# Patient Record
Sex: MALE | NOT HISPANIC OR LATINO
[De-identification: names, ages, dates, MRNs, and addresses within clinical notes are randomized per-mention and may not be internally consistent; named-entity substitution may affect disease eponyms.]

---

## 2018-01-31 ENCOUNTER — APPOINTMENT (OUTPATIENT)
Dept: UROLOGY | Facility: CLINIC | Age: 83
End: 2018-01-31
Payer: MEDICARE

## 2018-01-31 VITALS — BODY MASS INDEX: 21.71 KG/M2 | WEIGHT: 118 LBS | HEIGHT: 62 IN

## 2018-01-31 VITALS
HEART RATE: 68 BPM | TEMPERATURE: 98.2 F | SYSTOLIC BLOOD PRESSURE: 150 MMHG | DIASTOLIC BLOOD PRESSURE: 68 MMHG | RESPIRATION RATE: 15 BRPM

## 2018-01-31 PROCEDURE — 99204 OFFICE O/P NEW MOD 45 MIN: CPT

## 2018-03-02 ENCOUNTER — APPOINTMENT (OUTPATIENT)
Dept: UROLOGY | Facility: CLINIC | Age: 83
End: 2018-03-02
Payer: MEDICARE

## 2018-03-02 VITALS
HEART RATE: 67 BPM | RESPIRATION RATE: 16 BRPM | SYSTOLIC BLOOD PRESSURE: 156 MMHG | TEMPERATURE: 98.1 F | DIASTOLIC BLOOD PRESSURE: 60 MMHG | OXYGEN SATURATION: 97 %

## 2018-03-02 DIAGNOSIS — R39.15 URGENCY OF URINATION: ICD-10-CM

## 2018-03-02 PROCEDURE — 99213 OFFICE O/P EST LOW 20 MIN: CPT

## 2018-03-09 ENCOUNTER — APPOINTMENT (OUTPATIENT)
Dept: UROLOGY | Facility: CLINIC | Age: 83
End: 2018-03-09
Payer: MEDICARE

## 2018-03-09 DIAGNOSIS — N40.1 BENIGN PROSTATIC HYPERPLASIA WITH LOWER URINARY TRACT SYMPMS: ICD-10-CM

## 2018-03-09 DIAGNOSIS — N13.8 BENIGN PROSTATIC HYPERPLASIA WITH LOWER URINARY TRACT SYMPMS: ICD-10-CM

## 2018-03-09 PROCEDURE — 52000 CYSTOURETHROSCOPY: CPT

## 2018-03-15 PROBLEM — N40.1 BENIGN LOCALIZED HYPERPLASIA OF PROSTATE WITH URINARY OBSTRUCTION: Status: ACTIVE | Noted: 2018-01-31

## 2018-03-28 ENCOUNTER — APPOINTMENT (OUTPATIENT)
Dept: UROLOGY | Facility: CLINIC | Age: 83
End: 2018-03-28

## 2018-05-02 ENCOUNTER — APPOINTMENT (OUTPATIENT)
Dept: UROLOGY | Facility: CLINIC | Age: 83
End: 2018-05-02

## 2019-06-27 ENCOUNTER — APPOINTMENT (OUTPATIENT)
Dept: RHEUMATOLOGY | Facility: CLINIC | Age: 84
End: 2019-06-27
Payer: MEDICARE

## 2019-06-27 VITALS
RESPIRATION RATE: 16 BRPM | HEIGHT: 62 IN | DIASTOLIC BLOOD PRESSURE: 54 MMHG | SYSTOLIC BLOOD PRESSURE: 115 MMHG | BODY MASS INDEX: 20.24 KG/M2 | TEMPERATURE: 98.5 F | HEART RATE: 66 BPM | WEIGHT: 110 LBS | OXYGEN SATURATION: 96 %

## 2019-06-27 PROCEDURE — 99204 OFFICE O/P NEW MOD 45 MIN: CPT

## 2019-06-27 RX ORDER — OMEPRAZOLE 40 MG/1
40 CAPSULE, DELAYED RELEASE ORAL
Refills: 0 | Status: ACTIVE | COMMUNITY

## 2019-06-27 RX ORDER — TAMSULOSIN HYDROCHLORIDE 0.4 MG/1
0.4 CAPSULE ORAL
Refills: 0 | Status: ACTIVE | COMMUNITY

## 2019-06-27 RX ORDER — VALSARTAN 160 MG/1
160 TABLET, COATED ORAL
Refills: 0 | Status: ACTIVE | COMMUNITY

## 2019-06-27 RX ORDER — ATORVASTATIN CALCIUM 80 MG/1
TABLET, FILM COATED ORAL
Refills: 0 | Status: ACTIVE | COMMUNITY

## 2019-06-28 ENCOUNTER — RX RENEWAL (OUTPATIENT)
Age: 84
End: 2019-06-28

## 2019-06-28 DIAGNOSIS — E55.9 VITAMIN D DEFICIENCY, UNSPECIFIED: ICD-10-CM

## 2019-06-28 LAB
25(OH)D3 SERPL-MCNC: 15 NG/ML
ALBUMIN SERPL ELPH-MCNC: 4.2 G/DL
ALP BLD-CCNC: 43 U/L
ALT SERPL-CCNC: 37 U/L
ANION GAP SERPL CALC-SCNC: 13 MMOL/L
APPEARANCE: CLEAR
AST SERPL-CCNC: 35 U/L
BASOPHILS # BLD AUTO: 0.02 K/UL
BASOPHILS NFR BLD AUTO: 0.3 %
BILIRUB SERPL-MCNC: 0.7 MG/DL
BILIRUBIN URINE: NEGATIVE
BLOOD URINE: NORMAL
BUN SERPL-MCNC: 23 MG/DL
CALCIUM SERPL-MCNC: 9.1 MG/DL
CHLORIDE SERPL-SCNC: 99 MMOL/L
CO2 SERPL-SCNC: 22 MMOL/L
COLOR: NORMAL
CREAT SERPL-MCNC: 1.52 MG/DL
CRP SERPL-MCNC: 0.19 MG/DL
EOSINOPHIL # BLD AUTO: 0.04 K/UL
EOSINOPHIL NFR BLD AUTO: 0.6 %
ERYTHROCYTE [SEDIMENTATION RATE] IN BLOOD BY WESTERGREN METHOD: 23 MM/HR
GLUCOSE QUALITATIVE U: NEGATIVE
GLUCOSE SERPL-MCNC: 151 MG/DL
HCT VFR BLD CALC: 34.2 %
HGB BLD-MCNC: 11.4 G/DL
IMM GRANULOCYTES NFR BLD AUTO: 0.3 %
KETONES URINE: NEGATIVE
LEUKOCYTE ESTERASE URINE: NEGATIVE
LYMPHOCYTES # BLD AUTO: 0.83 K/UL
LYMPHOCYTES NFR BLD AUTO: 11.6 %
MAN DIFF?: NORMAL
MCHC RBC-ENTMCNC: 30.5 PG
MCHC RBC-ENTMCNC: 33.3 GM/DL
MCV RBC AUTO: 91.4 FL
MONOCYTES # BLD AUTO: 0.52 K/UL
MONOCYTES NFR BLD AUTO: 7.3 %
NEUTROPHILS # BLD AUTO: 5.74 K/UL
NEUTROPHILS NFR BLD AUTO: 79.9 %
NITRITE URINE: NEGATIVE
PH URINE: 5.5
PLATELET # BLD AUTO: 156 K/UL
POTASSIUM SERPL-SCNC: 3.9 MMOL/L
PROT SERPL-MCNC: 6.9 G/DL
PROTEIN URINE: NEGATIVE
RBC # BLD: 3.74 M/UL
RBC # FLD: 13.5 %
RHEUMATOID FACT SER QL: <10 IU/ML
SODIUM SERPL-SCNC: 134 MMOL/L
SPECIFIC GRAVITY URINE: 1.01
URATE SERPL-MCNC: 3.1 MG/DL
UROBILINOGEN URINE: NORMAL
WBC # FLD AUTO: 7.17 K/UL

## 2019-06-28 RX ORDER — ERGOCALCIFEROL 1.25 MG/1
1.25 MG CAPSULE, LIQUID FILLED ORAL
Qty: 12 | Refills: 3 | Status: ACTIVE | COMMUNITY
Start: 2019-06-28 | End: 1900-01-01

## 2019-06-30 NOTE — PHYSICAL EXAM
[General Appearance - Alert] : alert [General Appearance - In No Acute Distress] : in no acute distress [Neck Appearance] : the appearance of the neck was normal [Neck Cervical Mass (___cm)] : no neck mass was observed [Jugular Venous Distention Increased] : there was no jugular-venous distention [Thyroid Diffuse Enlargement] : the thyroid was not enlarged [Thyroid Nodule] : there were no palpable thyroid nodules [Auscultation Breath Sounds / Voice Sounds] : lungs were clear to auscultation bilaterally [Heart Rate And Rhythm] : heart rate was normal and rhythm regular [Heart Sounds] : normal S1 and S2 [Heart Sounds Gallop] : no gallops [Murmurs] : no murmurs [Heart Sounds Pericardial Friction Rub] : no pericardial rub [Bowel Sounds] : normal bowel sounds [Abdomen Soft] : soft [Abdomen Tenderness] : non-tender [Abdomen Mass (___ Cm)] : no abdominal mass palpated [Cervical Lymph Nodes Enlarged Posterior Bilaterally] : posterior cervical [Cervical Lymph Nodes Enlarged Anterior Bilaterally] : anterior cervical [Supraclavicular Lymph Nodes Enlarged Bilaterally] : supraclavicular [FreeTextEntry1] : bilateral wrist with fusion like deformities and inability to flex or extend - all other joints with full ROM - no synovitis [Skin Color & Pigmentation] : normal skin color and pigmentation [Skin Turgor] : normal skin turgor [] : no rash [Oriented To Time, Place, And Person] : oriented to person, place, and time [Impaired Insight] : insight and judgment were intact [Affect] : the affect was normal

## 2019-06-30 NOTE — HISTORY OF PRESENT ILLNESS
[FreeTextEntry1] : Patient with multiple joint pain - worse over the knees with difficulty walking and standing- can take a few minutes to be able to walk after standing. \par Feeling of heat over bilateral lower extremities - constant\par Had x-rays of the knees - told to be normal\par Bilateral hand cramping and pain and stiffness  worse in the morning\par No swelling or redness\par NO back pain\par PMH of HTN and Gout - on uloric - no flares over the last 2 years\par \par takes tylenol as needed for severe pain\par \par \par Denies any fevers, chill, rashes, weight loss,fatigue,  hair loss, joint pains, dry eyes or mouth, mouth sores, Raynaud's. chest pain or SOB, GI or , numbness/tingling\par \par

## 2019-06-30 NOTE — ASSESSMENT
[FreeTextEntry1] : 85 year-old male with multiple joint pain - worse over the knees and hands\par Physical exam with fusion like deformity of the wrists\par D.Dx: RA, OA, neuropathy\par will request nee x-rays results\par \par add x-rays fo the wrists\par basic rheum work-up\par \par will take tylenol 500 mg bid to help with symptoms until results are back\par \par patient to call in 1 week to discuss results and next steps\par

## 2019-07-02 LAB
CCP AB SER IA-ACNC: 22 UNITS
RF+CCP IGG SER-IMP: ABNORMAL

## 2019-07-07 ENCOUNTER — FORM ENCOUNTER (OUTPATIENT)
Age: 84
End: 2019-07-07

## 2019-07-07 DIAGNOSIS — M87.00 IDIOPATHIC ASEPTIC NECROSIS OF UNSPECIFIED BONE: ICD-10-CM

## 2019-07-08 ENCOUNTER — OUTPATIENT (OUTPATIENT)
Dept: OUTPATIENT SERVICES | Facility: HOSPITAL | Age: 84
LOS: 1 days | End: 2019-07-08
Payer: COMMERCIAL

## 2019-07-08 DIAGNOSIS — M79.604 PAIN IN RIGHT LEG: ICD-10-CM

## 2019-07-08 DIAGNOSIS — M79.605 PAIN IN LEFT LEG: ICD-10-CM

## 2019-07-08 DIAGNOSIS — M25.50 PAIN IN UNSPECIFIED JOINT: ICD-10-CM

## 2019-07-08 PROCEDURE — 73130 X-RAY EXAM OF HAND: CPT

## 2019-07-08 PROCEDURE — 72100 X-RAY EXAM L-S SPINE 2/3 VWS: CPT

## 2019-07-08 PROCEDURE — 73130 X-RAY EXAM OF HAND: CPT | Mod: 26,50

## 2019-07-08 PROCEDURE — 72100 X-RAY EXAM L-S SPINE 2/3 VWS: CPT | Mod: 26

## 2019-07-09 PROBLEM — M87.00 AVASCULAR NECROSIS: Status: ACTIVE | Noted: 2019-07-09

## 2019-07-18 ENCOUNTER — FORM ENCOUNTER (OUTPATIENT)
Age: 84
End: 2019-07-18

## 2019-07-19 ENCOUNTER — OUTPATIENT (OUTPATIENT)
Dept: OUTPATIENT SERVICES | Facility: HOSPITAL | Age: 84
LOS: 1 days | End: 2019-07-19
Payer: COMMERCIAL

## 2019-07-19 ENCOUNTER — APPOINTMENT (OUTPATIENT)
Dept: MRI IMAGING | Facility: CLINIC | Age: 84
End: 2019-07-19
Payer: MEDICARE

## 2019-07-19 DIAGNOSIS — Z00.8 ENCOUNTER FOR OTHER GENERAL EXAMINATION: ICD-10-CM

## 2019-07-19 PROCEDURE — 73221 MRI JOINT UPR EXTREM W/O DYE: CPT

## 2019-07-19 PROCEDURE — 73221 MRI JOINT UPR EXTREM W/O DYE: CPT | Mod: 26,RT

## 2019-07-26 ENCOUNTER — APPOINTMENT (OUTPATIENT)
Dept: RHEUMATOLOGY | Facility: CLINIC | Age: 84
End: 2019-07-26
Payer: MEDICARE

## 2019-07-26 VITALS
HEART RATE: 73 BPM | OXYGEN SATURATION: 95 % | TEMPERATURE: 98.3 F | BODY MASS INDEX: 20.06 KG/M2 | SYSTOLIC BLOOD PRESSURE: 115 MMHG | DIASTOLIC BLOOD PRESSURE: 56 MMHG | HEIGHT: 62 IN | WEIGHT: 109 LBS | RESPIRATION RATE: 16 BRPM

## 2019-07-26 DIAGNOSIS — M79.604 PAIN IN RIGHT LEG: ICD-10-CM

## 2019-07-26 DIAGNOSIS — M25.50 PAIN IN UNSPECIFIED JOINT: ICD-10-CM

## 2019-07-26 DIAGNOSIS — M79.605 PAIN IN RIGHT LEG: ICD-10-CM

## 2019-07-26 DIAGNOSIS — R25.2 CRAMP AND SPASM: ICD-10-CM

## 2019-07-26 PROCEDURE — 99213 OFFICE O/P EST LOW 20 MIN: CPT

## 2019-07-26 RX ORDER — ATORVASTATIN CALCIUM 80 MG/1
TABLET, FILM COATED ORAL
Refills: 0 | Status: DISCONTINUED | COMMUNITY
End: 2019-07-26

## 2019-07-26 NOTE — PHYSICAL EXAM
[General Appearance - Alert] : alert [General Appearance - In No Acute Distress] : in no acute distress [Neck Appearance] : the appearance of the neck was normal [Thyroid Diffuse Enlargement] : the thyroid was not enlarged [Jugular Venous Distention Increased] : there was no jugular-venous distention [Neck Cervical Mass (___cm)] : no neck mass was observed [Thyroid Nodule] : there were no palpable thyroid nodules [Auscultation Breath Sounds / Voice Sounds] : lungs were clear to auscultation bilaterally [Heart Rate And Rhythm] : heart rate was normal and rhythm regular [Heart Sounds] : normal S1 and S2 [Heart Sounds Gallop] : no gallops [Murmurs] : no murmurs [Heart Sounds Pericardial Friction Rub] : no pericardial rub [Bowel Sounds] : normal bowel sounds [Abdomen Soft] : soft [Abdomen Tenderness] : non-tender [Abdomen Mass (___ Cm)] : no abdominal mass palpated [Cervical Lymph Nodes Enlarged Posterior Bilaterally] : posterior cervical [Cervical Lymph Nodes Enlarged Anterior Bilaterally] : anterior cervical [Supraclavicular Lymph Nodes Enlarged Bilaterally] : supraclavicular [Skin Color & Pigmentation] : normal skin color and pigmentation [FreeTextEntry1] : bilateral wrist with fusion like deformities and inability to flex or extend - all other joints with full ROM - no synovitis [Oriented To Time, Place, And Person] : oriented to person, place, and time [Skin Turgor] : normal skin turgor [] : no rash [Impaired Insight] : insight and judgment were intact [Affect] : the affect was normal

## 2019-07-26 NOTE — HISTORY OF PRESENT ILLNESS
[___ Month(s) Ago] : [unfilled] month(s) ago [FreeTextEntry1] : x-rays of hands and lumbar spine with pronounced OA - wrists with possible AVN\par MRi of bilateral hands with not AVN, but signs of severe OA.\par Continues to complain of bilateral leg burning sensation worse over the right knee\par minimal weakness - related to pain\par

## 2019-07-26 NOTE — ASSESSMENT
[FreeTextEntry1] : 85 year-old male with multiple joint pain - worse over the knees and hands\par Physical exam with fusion like deformity of the wrists\par \par 1. Bilateral hand pain - labs with mildly elevated CCP - MRI with severe OA changes - will try castor oil - topical at this time\par 2. Bilateral leg pain - lumbar spine with DDD - but no back pain - patient is on atorvastatin - will stop for 2 weeks to see if he improves - if now will try gabapentin and PT\par \par \par I reviewed previous labs results with patients.\par Laboratory tests ordered today\par Diagnosis and Prognosis discussed\par Continue with current medications\par education provided on pain control\par F/u 2 months\par

## 2019-08-30 ENCOUNTER — RX RENEWAL (OUTPATIENT)
Age: 84
End: 2019-08-30

## 2020-07-13 ENCOUNTER — INPATIENT (INPATIENT)
Facility: HOSPITAL | Age: 85
LOS: 4 days | Discharge: HOME | DRG: 683 | End: 2020-07-18
Attending: INTERNAL MEDICINE | Admitting: INTERNAL MEDICINE
Payer: COMMERCIAL

## 2020-07-13 VITALS
HEIGHT: 60.63 IN | RESPIRATION RATE: 16 BRPM | SYSTOLIC BLOOD PRESSURE: 171 MMHG | HEART RATE: 43 BPM | OXYGEN SATURATION: 98 % | TEMPERATURE: 98 F | WEIGHT: 112.44 LBS | DIASTOLIC BLOOD PRESSURE: 45 MMHG

## 2020-07-13 DIAGNOSIS — E87.1 HYPO-OSMOLALITY AND HYPONATREMIA: ICD-10-CM

## 2020-07-13 DIAGNOSIS — R00.1 BRADYCARDIA, UNSPECIFIED: ICD-10-CM

## 2020-07-13 DIAGNOSIS — N17.9 ACUTE KIDNEY FAILURE, UNSPECIFIED: ICD-10-CM

## 2020-07-13 DIAGNOSIS — K21.9 GASTRO-ESOPHAGEAL REFLUX DISEASE WITHOUT ESOPHAGITIS: ICD-10-CM

## 2020-07-13 DIAGNOSIS — Z29.9 ENCOUNTER FOR PROPHYLACTIC MEASURES, UNSPECIFIED: ICD-10-CM

## 2020-07-13 DIAGNOSIS — I10 ESSENTIAL (PRIMARY) HYPERTENSION: ICD-10-CM

## 2020-07-13 DIAGNOSIS — R42 DIZZINESS AND GIDDINESS: ICD-10-CM

## 2020-07-13 DIAGNOSIS — Z71.89 OTHER SPECIFIED COUNSELING: ICD-10-CM

## 2020-07-13 DIAGNOSIS — M10.9 GOUT, UNSPECIFIED: ICD-10-CM

## 2020-07-13 DIAGNOSIS — N40.0 BENIGN PROSTATIC HYPERPLASIA WITHOUT LOWER URINARY TRACT SYMPTOMS: ICD-10-CM

## 2020-07-13 LAB
ALBUMIN SERPL ELPH-MCNC: 3.5 G/DL — SIGNIFICANT CHANGE UP (ref 3.5–5)
ALP SERPL-CCNC: 38 U/L — LOW (ref 40–120)
ALT FLD-CCNC: 41 U/L DA — SIGNIFICANT CHANGE UP (ref 10–60)
ANION GAP SERPL CALC-SCNC: 12 MMOL/L — SIGNIFICANT CHANGE UP (ref 5–17)
APPEARANCE UR: CLEAR — SIGNIFICANT CHANGE UP
AST SERPL-CCNC: 41 U/L — HIGH (ref 10–40)
BACTERIA # UR AUTO: ABNORMAL /HPF
BILIRUB SERPL-MCNC: 0.9 MG/DL — SIGNIFICANT CHANGE UP (ref 0.2–1.2)
BILIRUB UR-MCNC: NEGATIVE — SIGNIFICANT CHANGE UP
BUN SERPL-MCNC: 41 MG/DL — HIGH (ref 7–18)
CALCIUM SERPL-MCNC: 8.4 MG/DL — SIGNIFICANT CHANGE UP (ref 8.4–10.5)
CHLORIDE SERPL-SCNC: 87 MMOL/L — LOW (ref 96–108)
CHLORIDE UR-SCNC: <10 MMOL/L — SIGNIFICANT CHANGE UP
CO2 SERPL-SCNC: 21 MMOL/L — LOW (ref 22–31)
COLOR SPEC: YELLOW — SIGNIFICANT CHANGE UP
CREAT ?TM UR-MCNC: 122 MG/DL — SIGNIFICANT CHANGE UP
CREAT SERPL-MCNC: 2.4 MG/DL — HIGH (ref 0.5–1.3)
DIFF PNL FLD: ABNORMAL
EPI CELLS # UR: SIGNIFICANT CHANGE UP /HPF
GLUCOSE SERPL-MCNC: 125 MG/DL — HIGH (ref 70–99)
GLUCOSE UR QL: NEGATIVE — SIGNIFICANT CHANGE UP
GRAN CASTS # UR COMP ASSIST: ABNORMAL /LPF
HCT VFR BLD CALC: 33.2 % — LOW (ref 39–50)
HGB BLD-MCNC: 11.8 G/DL — LOW (ref 13–17)
HYALINE CASTS # UR AUTO: ABNORMAL /LPF
KETONES UR-MCNC: NEGATIVE — SIGNIFICANT CHANGE UP
LEUKOCYTE ESTERASE UR-ACNC: NEGATIVE — SIGNIFICANT CHANGE UP
MCHC RBC-ENTMCNC: 30.6 PG — SIGNIFICANT CHANGE UP (ref 27–34)
MCHC RBC-ENTMCNC: 35.5 GM/DL — SIGNIFICANT CHANGE UP (ref 32–36)
MCV RBC AUTO: 86.2 FL — SIGNIFICANT CHANGE UP (ref 80–100)
NITRITE UR-MCNC: NEGATIVE — SIGNIFICANT CHANGE UP
NRBC # BLD: 0 /100 WBCS — SIGNIFICANT CHANGE UP (ref 0–0)
OSMOLALITY UR: 372 MOS/KG — SIGNIFICANT CHANGE UP (ref 50–1200)
PH UR: 5 — SIGNIFICANT CHANGE UP (ref 5–8)
PLATELET # BLD AUTO: 178 K/UL — SIGNIFICANT CHANGE UP (ref 150–400)
POTASSIUM SERPL-MCNC: 4.4 MMOL/L — SIGNIFICANT CHANGE UP (ref 3.5–5.3)
POTASSIUM SERPL-SCNC: 4.4 MMOL/L — SIGNIFICANT CHANGE UP (ref 3.5–5.3)
PROT ?TM UR-MCNC: 21 MG/DL — HIGH (ref 0–12)
PROT SERPL-MCNC: 7.3 G/DL — SIGNIFICANT CHANGE UP (ref 6–8.3)
PROT UR-MCNC: 15
RBC # BLD: 3.85 M/UL — LOW (ref 4.2–5.8)
RBC # FLD: 13 % — SIGNIFICANT CHANGE UP (ref 10.3–14.5)
RBC CASTS # UR COMP ASSIST: ABNORMAL /HPF (ref 0–2)
SODIUM SERPL-SCNC: 120 MMOL/L — CRITICAL LOW (ref 135–145)
SODIUM UR-SCNC: 12 MMOL/L — SIGNIFICANT CHANGE UP
SP GR SPEC: 1.02 — SIGNIFICANT CHANGE UP (ref 1.01–1.02)
TROPONIN I SERPL-MCNC: <0.015 NG/ML — SIGNIFICANT CHANGE UP (ref 0–0.04)
UROBILINOGEN FLD QL: NEGATIVE — SIGNIFICANT CHANGE UP
WBC # BLD: 7.23 K/UL — SIGNIFICANT CHANGE UP (ref 3.8–10.5)
WBC # FLD AUTO: 7.23 K/UL — SIGNIFICANT CHANGE UP (ref 3.8–10.5)
WBC UR QL: SIGNIFICANT CHANGE UP /HPF (ref 0–5)

## 2020-07-13 PROCEDURE — 99285 EMERGENCY DEPT VISIT HI MDM: CPT

## 2020-07-13 PROCEDURE — 99223 1ST HOSP IP/OBS HIGH 75: CPT

## 2020-07-13 PROCEDURE — 71046 X-RAY EXAM CHEST 2 VIEWS: CPT | Mod: 26

## 2020-07-13 PROCEDURE — 70450 CT HEAD/BRAIN W/O DYE: CPT | Mod: 26

## 2020-07-13 RX ORDER — ATORVASTATIN CALCIUM 80 MG/1
10 TABLET, FILM COATED ORAL AT BEDTIME
Refills: 0 | Status: DISCONTINUED | OUTPATIENT
Start: 2020-07-13 | End: 2020-07-18

## 2020-07-13 RX ORDER — HEPARIN SODIUM 5000 [USP'U]/ML
5000 INJECTION INTRAVENOUS; SUBCUTANEOUS EVERY 12 HOURS
Refills: 0 | Status: DISCONTINUED | OUTPATIENT
Start: 2020-07-13 | End: 2020-07-18

## 2020-07-13 RX ORDER — TAMSULOSIN HYDROCHLORIDE 0.4 MG/1
0.4 CAPSULE ORAL AT BEDTIME
Refills: 0 | Status: DISCONTINUED | OUTPATIENT
Start: 2020-07-13 | End: 2020-07-18

## 2020-07-13 RX ORDER — MECLIZINE HCL 12.5 MG
25 TABLET ORAL ONCE
Refills: 0 | Status: COMPLETED | OUTPATIENT
Start: 2020-07-13 | End: 2020-07-13

## 2020-07-13 RX ORDER — PANTOPRAZOLE SODIUM 20 MG/1
40 TABLET, DELAYED RELEASE ORAL
Refills: 0 | Status: DISCONTINUED | OUTPATIENT
Start: 2020-07-13 | End: 2020-07-18

## 2020-07-13 RX ORDER — ATROPINE SULFATE 0.1 MG/ML
0.5 SYRINGE (ML) INJECTION ONCE
Refills: 0 | Status: DISCONTINUED | OUTPATIENT
Start: 2020-07-13 | End: 2020-07-17

## 2020-07-13 RX ADMIN — Medication 25 MILLIGRAM(S): at 18:35

## 2020-07-13 NOTE — H&P ADULT - PROBLEM SELECTOR PLAN 1
pT Na LEVEL 120  -hx of no salt intake  -on candesartan at home  -f/u urine lytes  -f/u Fena  -started pt on regular diet  -iv fluids  -monitor bmp -p/w dizziness   Admit to telemetry and closely monitor tele and vital signs  - Serial cardiac enzymes  - Hold beta blocker for now for HR   - Substitute BP meds from another class of meds  - ECHO in AM; lipid panel; TSH/T4; A1c  - Cardiology consult -p/w dizziness   Admit to telemetry and closely monitor tele and vital signs  - Serial cardiac enzymes  - Hold beta blocker for now for HR   - Substitute BP meds from another class of meds  - ECHO in AM; lipid panel; TSH, hbA1c,   - Cardiology consult in am -p/w dizziness   Admit to telemetry and closely monitor tele and vital signs  - Serial cardiac enzymes  - Hold beta blocker for now for HR   - Substitute BP meds from another class of meds  - ECHO in AM; lipid panel; TSH, hbA1c,   - Cardiology consulted Dr. Zhong

## 2020-07-13 NOTE — H&P ADULT - PROBLEM SELECTOR PLAN 2
-pt p/w dizziness for 2 week and medication not helping, had few falls   -EKG shows mercy sinus 40s  -will admit to tele  -stress test In 2016 shows EF >70%  -will benefit from echo with bubble study and carotid dopplars

## 2020-07-13 NOTE — H&P ADULT - NSICDXPASTMEDICALHX_GEN_ALL_CORE_FT
PAST MEDICAL HISTORY:  BPH (benign prostatic hyperplasia)     GERD (gastroesophageal reflux disease)     Gout     HTN (hypertension)

## 2020-07-13 NOTE — H&P ADULT - PROBLEM SELECTOR PLAN 5
Pt has hx of htn  -on candesartan 16mg qd, metoprolol 50mg   -will hold metoprolol insetting of bradycardia  -holding losartan in setting of [place]  -will consider amlodipine or hydralazine for controlling high blood pressure -pT has hx of bph   -on tamsulosin 0.4mg qd  -will continue home dose

## 2020-07-13 NOTE — H&P ADULT - PROBLEM SELECTOR PLAN 3
-pt Cr 2.4  -Cr from 2018 is 1.4, 2017 1.3... unknown baseline  -monitor BMP  -F/U URINE LYTES  -on losartan will hold in setting of kristofer  -avoid any nephrotoxic meds and iv contrast pT Na LEVEL 120 ,most likely chronic  -hx of no salt intake  -on candesartan at home  -f/u urine lytes  -f/u Fena  -started pt on regular diet  -iv fluids= Will  correct slowly with isotonic fluid  -monitor bmp pT Na LEVEL 120 ,most likely chronic  -hx of no salt intake  -on candesartan at home  -f/u urine lytes  -f/u Fena  -started pt on regular diet  -iv fluids= Will  correct slowly with isotonic fluid, target correction by 6-8 within 24 hours  -monitor bmp

## 2020-07-13 NOTE — ED PROVIDER NOTE - NEUROLOGICAL, MLM
Alert and oriented, no focal deficits, no motor or sensory deficits. cn ii- xii intact, non-ataxic gait, normal finger to nose

## 2020-07-13 NOTE — H&P ADULT - HISTORY OF PRESENT ILLNESS
86 year old male [place]  from home lives with wife ,walks independently came with hx of HTN, HLD, GERD, BPH, vertigo presents with c/o severe dizziness lasting more than 4 hrs every day over 2 weeks associated sometimes with nausea and recent falls. This dizziness he says is different from his long standing vertigo. There is associated with intermittent palpitations, mild central chest pain but no SOB, or LOC.  pT denies any  acute visual changes but admits blurred vision with dizziness, no loss of vision, no ringing in ears, no fullness in ears. no fever, no abd pain, no dysuria. Pt says no covid exposure, no syncope, no focal weakness, no trouble swallowing. Pt endorsed some recent heaviness in legs and warmth in knee but no gouty tophy noticed on exam.  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  GOC= pt do not wanted to discuss and on asking daughter she said FULL CODE and said her father will be traumatized if you ask him this question.

## 2020-07-13 NOTE — H&P ADULT - MUSCULOSKELETAL
detailed exam no joint warmth/no joint erythema/no calf tenderness/normal strength/no joint swelling

## 2020-07-13 NOTE — ED PROVIDER NOTE - PROGRESS NOTE DETAILS
Pe[place]: Na 120 pt with veritgo sx likely cause. cxr clear  admit to med tele for dizziness due to hyponatremia with kristofer possibly from BPH. asx mercy. [place]res: Na 120 pt with veritgo sx likely cause. cxr clear  admit to med tele for dizziness due to hyponatremia with kristofer possibly from BPH. asx mercy pt never syncopized

## 2020-07-13 NOTE — ED ADULT NURSE NOTE - CHPI ED NUR SYMPTOMS NEG
no diaphoresis/no chills/no fever/no nausea/no congestion/no vomiting/no syncope/no back pain/no chest pain/no shortness of breath

## 2020-07-13 NOTE — ED ADULT TRIAGE NOTE - LOCATION:
PT CALLED IN AND SAID INSTEAD OF THE  REFERRAL THAT WAS GENERATED 08/08 THEY NEED AN ORDER.    Right arm;

## 2020-07-13 NOTE — ED PROVIDER NOTE - CLINICAL SUMMARY MEDICAL DECISION MAKING FREE TEXT BOX
86 yr old male with hx of HTN, HLD, GERD, BPH, vertigo presents to ed c/o severe dizziness lasting more than 4 hrs every day over 4 wks with intermitted chest discomfort but not pain.  pt has fallen due to dizziness. no acute visual changes but admits blurred vision with dizziness, no loss of vision, no fever, no abd pain, no dysuria, no covid exposure, no syncope, no focal weakness, no trouble swallowing.     vertigo likely peripheral r/o arrhythmis vs ic mass vs acs vs lytes imbalance.  pt asx and over 4 wks.  will check labs and ct head, ekg, meclizine.  dc if asx and outpt neuro.  bradycardia likely due to metoprolol (pt hypertensive)

## 2020-07-13 NOTE — ED PROVIDER NOTE - OBJECTIVE STATEMENT
86 yr old male with hx of HTN, HLD, GERD, BPH, vertigo presents to ed c/o severe dizziness lasting more than 4 hrs every day over 4 wks with intermitted chest discomfort but not pain.  pt has fallen due to dizziness. no acute visual changes but admits blurred vision with dizziness, no loss of vision, no fever, no abd pain, no dysuria, no covid exposure, no syncope, no focal weakness, no trouble swallowing.

## 2020-07-13 NOTE — ED ADULT NURSE NOTE - NSIMPLEMENTINTERV_GEN_ALL_ED
Implemented All Fall with Harm Risk Interventions:  [place] to call system. Call bell, personal items and telephone within reach. Instruct patient to call for assistance. Room bathroom lighting operational. Non-slip footwear when patient is off stretcher. Physically safe environment: no spills, clutter or unnecessary equipment. Stretcher in lowest position, wheels locked, appropriate side rails in place. Provide visual cue, wrist band, yellow gown, etc. Monitor gait and stability. Monitor for mental status changes and reorient to person, place, and time. Review medications for side effects contributing to fall risk. Reinforce activity limits and safety measures with patient and family. Provide visual clues: red socks.

## 2020-07-13 NOTE — H&P ADULT - PROBLEM SELECTOR PLAN 9
RISK                                                          Points  [] Previous VTE                                           3  [] Thrombophilia                                        2  [] Lower limb paralysis                              2   [] Current Cancer                                       2   [x] Immobilization > 24 hrs                        1  [] ICU/CCU stay > 24 hours                       1  [x] Age > 60                                                   1    score 2 sc heparin q12

## 2020-07-13 NOTE — H&P ADULT - PROBLEM SELECTOR PLAN 7
pt do not wanted to discuss and on asking daughter she said FULL CODE and said her father will be traumatized if you ask him this question.  Primary team to follow pt has a hx of gout   on Uloric 40mg   no active tophi or gouty joint noted

## 2020-07-13 NOTE — H&P ADULT - ASSESSMENT
86 year old male from home lives with wife ,walks independently came with hx of HTN, HLD, GERD, BPH, vertigo presents with c/o severe dizziness lasting more than 4 hrs every day over 2 weeks associated sometimes with nausea and recent falls. pT denies any  acute visual changes but admits blurred vision with dizziness, no loss of vision, no ringing in ears, no fullness in ears. no fever, no abd pain, no dysuria. Pt says no covid exposure, no syncope, no focal weakness, no trouble swallowing. Pt endorsed some recent heaviness in legs and warmth in knee but no gouty tophy noticed on exam.  Pt daughter was contacted who endorsed that pt refuses to eat salt in his diet and thinks salt is causing his blood pressure, also pt drinks very little water for same reasons.   GOC= pt do not wanted to discuss and on asking daughter she said FULL CODE and said her father will be traumatized if you ask him this question. Pt is afraid of dying and has not left his apartment for 4 months.

## 2020-07-13 NOTE — CHART NOTE - NSCHARTNOTEFT_GEN_A_CORE
EVENT: Hr in 30's    BRIEF HPI:     Vital Signs Last 24 Hrs  T(C): 36.2 (13 Jul 2020 19:40), Max: 36.9 (13 Jul 2020 17:59)  T(F): 97.2 (13 Jul 2020 19:40), Max: 98.4 (13 Jul 2020 17:59)  HR: 43 (13 Jul 2020 19:40) (43 - 43)  BP: 118/57 (13 Jul 2020 19:40) (118/57 - 171/45)  BP(mean): --  RR: 18 (13 Jul 2020 19:40) (16 - 18)  SpO2: 100% (13 Jul 2020 19:40) (98% - 100%) EVENT: Hr in 30's    BRIEF HPI:     Vital Signs Last 24 Hrs  T(C): 36.2 (13 Jul 2020 19:40), Max: 36.9 (13 Jul 2020 17:59)  T(F): 97.2 (13 Jul 2020 19:40), Max: 98.4 (13 Jul 2020 17:59)  HR: 43 (13 Jul 2020 19:40) (43 - 43)  BP: 118/57 (13 Jul 2020 19:40) (118/57 - 171/45)  BP(mean): --  RR: 18 (13 Jul 2020 19:40) (16 - 18)  SpO2: 100% (13 Jul 2020 19:40) (98% - 100%)      MEDICATIONS  (STANDING):  atropine Injectable 0.5 milliGRAM(s) IV Push once    MEDICATIONS  (PRN): EVENT: HR noted in 30's, denies chest pain    BRIEF HPI: 86 year old male [place]  from home lives with hx of HTN, HLD, GERD, BPH, vertigo presents with c/o severe dizziness lasting more than 4 hrs every day over 2 weeks associated sometimes with nausea and recent falls. Symptomatic sinus bradycardia with  dizziness , admitted  to telemetry for close monitoring of  tele and vital signs    Vital Signs Last 24 Hrs  T(C): 36.2 (13 Jul 2020 19:40), Max: 36.9 (13 Jul 2020 17:59)  T(F): 97.2 (13 Jul 2020 19:40), Max: 98.4 (13 Jul 2020 17:59)  HR: 43 (13 Jul 2020 19:40) (43 - 43)  BP: 118/57 (13 Jul 2020 19:40) (118/57 - 171/45)  BP(mean): --  RR: 18 (13 Jul 2020 19:40) (16 - 18)  SpO2: 100% (13 Jul 2020 19:40) (98% - 100%)    FOCUSED PE  CV: S1 S2, marked sinus mercy, denies cp  NEURO: Alert, responding appropriately  RESP: Even, unlabored    Troponin I, Serum: <0.015 ng/mL (07.13.20 @ 18:49)                          11.8   7.23  )-----------( 178      ( 13 Jul 2020 18:49 )             33.2     07-13    120<LL>  |  87<L>  |  41<H>  ----------------------------<  125<H>  4.4   |  21<L>  |  2.40<H>    Ca    8.4      13 Jul 2020 18:49    TPro  7.3  /  Alb  3.5  /  TBili  0.9  /  DBili  x   /  AST  41<H>  /  ALT  41  /  AlkPhos  38<L>  07-13    PROBLEM: Sinus mercy probably due to sick sinus  syndrome   PLAN  1. Atropine Injectable 0.5 giovani GRAM(s) IV Push once  2. Transcutaneous pacemaker on standby at bedside  3. F/U  Serial cardiac enzymes  4. Cont to hold beta blocker for low  HR   5. ECHO in AM; lipid panel; TSH, hbA1c,   6. F/u Cardiology consult in am  6. Cont to monitor VS as ordered  7. Cont atorvastatin 10 giovani GRAM(s) Oral at bedtime  8. Cont heparin   Injectable 5000 Unit(s) Subcutaneous every 12 hours  9. Cont sodium chloride 0.9%. 1000 milliliter(s) (100 mL/Hr) IV Continuous

## 2020-07-13 NOTE — H&P ADULT - PROBLEM SELECTOR PLAN 4
-pT has hx of bph   -on tamsulosin 0.4mg qd  -will continue home dose -pt Cr 2.4  -Cr from 2018 is 1.4, 2017 1.3... unknown baseline  -monitor BMP  -F/U URINE LYTES  -on losartan will hold in setting of kristofer  -avoid any nephrotoxic meds and iv contrast  -wll benefit from renal US  -gfr 24 if ckd its stage 4 that's indication for nephrologist followup

## 2020-07-13 NOTE — H&P ADULT - PROBLEM SELECTOR PLAN 10
pt do not wanted to discuss and on asking daughter she said FULL CODE and said her father will be traumatized if you ask him this question.  Primary team to follow

## 2020-07-13 NOTE — H&P ADULT - PROBLEM SELECTOR PLAN 8
-pt has a hx of GERD  -AT HOME ON BOTH OMEPRAZOLE 40MG AND RANITIDINE 150MG  -will comtinue protonix

## 2020-07-13 NOTE — H&P ADULT - PROBLEM SELECTOR PLAN 6
RISK                                                          Points  [] Previous VTE                                           3  [] Thrombophilia                                        2  [] Lower limb paralysis                              2   [] Current Cancer                                       2   [x] Immobilization > 24 hrs                        1  [] ICU/CCU stay > 24 hours                       1  [x] Age > 60                                                   1    score 2 sc heparin q12 Pt has hx of htn  -on candesartan 16mg qd, metoprolol 50mg   -will hold metoprolol insetting of bradycardia  -holding losartan in setting of [place]  -will consider amlodipine or hydralazine for controlling high blood pressure

## 2020-07-13 NOTE — H&P ADULT - ATTENDING COMMENTS
Pt seen and examined.  Case discussed with MAR    82 year old man - Cantonese speaking with PMH of HTN, BPH, GERD, chronic vertigo who presented to the ED on account of persistent dizziness for the past 2 weeks. He states he has vertigo at baseline which occurs intermittently, which his PCP has worked up with no findings and with no response to meds.   He describes his dizziness as different( but unable to provide any finer details) from his long standing vertigo. There is associated  intermittent palpitations, mild central chest pain but no SOB, falls or LOC. ROS not contributory.  In the ED, he is bradycardic and EKG sinus bradycardia in the 40s.    Vital Signs Last 24 Hrs  T(C): 36.9 (13 Jul 2020 17:59), Max: 36.9 (13 Jul 2020 17:59)  T(F): 98.4 (13 Jul 2020 17:59), Max: 98.4 (13 Jul 2020 17:59)  HR: 43 (13 Jul 2020 17:59) (43 - 43)  BP: 171/45 (13 Jul 2020 17:59) (171/45 - 171/45)  RR: 16 (13 Jul 2020 17:59) (16 - 16)  SpO2: 98% (13 Jul 2020 17:59) (98% - 98%)    Elderly man, [place], NAD AAO X 3   CTA B/L; RRR S1S2 with bradycardia and no chronotropic response  Soft NTND BS +  No pedal edema  No focal deficits    Labs                        11.8   7.23  )-----------( 178      ( 13 Jul 2020 18:49 )             33.2     07-13    120  |  87  |  41  --------------------<  125<H>  4.4   |  21  |  2.40    Ca    8.4      13 Jul 2020 18:49    TPro  7.3  /  Alb  3.5  /  TBili  0.9  /  DBili  x   /  AST  41<H>  /  ALT  41  /  AlkPhos  38<L>  07-13    CARDIAC MARKERS ( 13 Jul 2020 18:49 )  <0.015 ng/mL / x     / x     / x     / x        Head CT  There is no evidence of intraparenchymal or extraaxial hemorrhage.   There is no CT evidence of large vessel acute infarct. No mass effect is found in the brain.  No evidence of midline shift or herniation pattern.  The ventricles, sulci and basal cisterns appear unremarkable.  Visualized paranasal sinuses are clear.    IMPRESSION:     No acute intracranial findings.    Impression  86 year old man with HTN on B-blocker( metoprolol ) and ARB who presents to the ED with persistent dizziness, palpitations, sinus bradycardia for the past 2 weeks. There is no chronotropic response to activity with HR. He denies any active chest pain and his dizziness is at baseline. In addition, there is incidental finding of moderately severe hyponatremia likely in the setting of dehydration and MARTITA (+/- CKD).    A/P  Symptomatic sinus bradycardia  - hemodynamically stable  - Admit to telemetry and closely monitor tele and vital signs  - Serial cardiac enzymes  - Hold beta blocker for now for HR   - Substitute BP meds from another class of meds  - ECHO in AM; lipid panel; TSH/T4; A1c  - Cardiology consult    Moderately severe hyponatremia  - Unsure acuity of presentation  - Last normal labs seen here about 4 years ago  - Will obtain urine osmolality and Na/ Cr and serum osmolality.  - Will  correct slowly with isotonic fluid    MARTITA ( +/_ CKD)  - Will hydrate and recheck labs post evaluation of hyponatremia etiology  - Obtain renal U/S  - Consider nephrology consult  - Pt seen and examined.  Case discussed with MAR    82 year old man - [place]onese speaking with PMH of HTN, BPH, GERD, chronic vertigo who presented to the ED on account of persistent dizziness for the past 2 weeks. He states he has vertigo at baseline which occurs intermittently, which his PCP has worked up with no findings and with no response to meds.   He describes his dizziness as different( but unable to provide any finer details) from his long standing vertigo. There is associated  intermittent palpitations, mild central chest pain but no SOB, falls or LOC. ROS not contributory.  In the ED, he is bradycardic and EKG sinus bradycardia in the 40s.    Vital Signs Last 24 Hrs  T(C): 36.9 (13 Jul 2020 17:59), Max: 36.9 (13 Jul 2020 17:59)  T(F): 98.4 (13 Jul 2020 17:59), Max: 98.4 (13 Jul 2020 17:59)  HR: 43 (13 Jul 2020 17:59) (43 - 43)  BP: 171/45 (13 Jul 2020 17:59) (171/45 - 171/45)  RR: 16 (13 Jul 2020 17:59) (16 - 16)  SpO2: 98% (13 Jul 2020 17:59) (98% - 98%)    Elderly man, [place], NAD AAO X 3   CTA B/L; RRR S1S2 with bradycardia and no chronotropic response  Soft NTND BS +  No pedal edema  No focal deficits    Labs                        11.8   7.23  )-----------( 178      ( 13 Jul 2020 18:49 )             33.2     07-13    120  |  87  |  41  --------------------<  125<H>  4.4   |  21  |  2.40    Ca    8.4      13 Jul 2020 18:49    TPro  7.3  /  Alb  3.5  /  TBili  0.9  /  DBili  x   /  AST  41<H>  /  ALT  41  /  AlkPhos  38<L>  07-13    CARDIAC MARKERS ( 13 Jul 2020 18:49 )  <0.015 ng/mL / x     / x     / x     / x        Head CT  There is no evidence of intraparenchymal or extraaxial hemorrhage.   There is no CT evidence of large vessel acute infarct. No mass effect is found in the brain.  No evidence of midline shift or herniation pattern.  The ventricles, sulci and basal cisterns appear unremarkable.  Visualized paranasal sinuses are clear.    IMPRESSION:     No acute intracranial findings.    Impression  86 year old man with HTN on B-blocker( metoprolol ) and ARB who presents to the ED with persistent dizziness, palpitations, sinus bradycardia for the past 2 weeks. There is no chronotropic response to activity with HR. He denies any active chest pain and his dizziness is at baseline. In addition, there is incidental finding of moderately severe hyponatremia likely in the setting of dehydration and MARTITA (+/- CKD).    A/P  Symptomatic sinus bradycardia  - hemodynamically stable  - Admit to telemetry and closely monitor tele and vital signs  - Serial cardiac enzymes  - Hold beta blocker for now for HR   - Substitute BP meds from another class of meds  - ECHO in AM; lipid panel; TSH/T4; A1c  - Cardiology consult    Moderately severe hyponatremia  - Unsure acuity of presentation  - Last normal labs seen here about 4 years ago  - Will obtain urine osmolality and Na/ Cr and serum osmolality.  - Will  correct slowly with isotonic fluid  - based on urine lytes - appears related to hypotonic hyponatremia from dehydration/hypovolemia  - Will replace with isotonic saline fluids    MARTITA ( +/_ CKD)  - Will hydrate and recheck labs post evaluation of hyponatremia etiology  - Obtain renal U/S  - Consider nephrology consult  - Pt seen and examined.  Case discussed with MAR    86 year old man - Cantonese speaking with PMH of HTN, BPH, GERD, chronic vertigo who presented to the ED on account of persistent dizziness for the past 2 weeks. He states he has vertigo at baseline which occurs intermittently, which his PCP has worked up with no findings and with no response to meds.   He describes his dizziness as different( but unable to provide any finer details) from his long standing vertigo. There is associated  intermittent palpitations, mild central chest pain but no SOB, falls or LOC. ROS not contributory.  In the ED, he is bradycardic and EKG sinus bradycardia in the 40s.    Vital Signs Last 24 Hrs  T(C): 36.9 (13 Jul 2020 17:59), Max: 36.9 (13 Jul 2020 17:59)  T(F): 98.4 (13 Jul 2020 17:59), Max: 98.4 (13 Jul 2020 17:59)  HR: 43 (13 Jul 2020 17:59) (43 - 43)  BP: 171/45 (13 Jul 2020 17:59) (171/45 - 171/45)  RR: 16 (13 Jul 2020 17:59) (16 - 16)  SpO2: 98% (13 Jul 2020 17:59) (98% - 98%)    Elderly man, [place], NAD AAO X 3   CTA B/L; RRR S1S2 with bradycardia and no chronotropic response  Soft NTND BS +  No pedal edema  No focal deficits    Labs                        11.8   7.23  )-----------( 178      ( 13 Jul 2020 18:49 )             33.2     07-13    120  |  87  |  41  --------------------<  125<H>  4.4   |  21  |  2.40    Ca    8.4      13 Jul 2020 18:49    TPro  7.3  /  Alb  3.5  /  TBili  0.9  /  DBili  x   /  AST  41<H>  /  ALT  41  /  AlkPhos  38<L>  07-13    CARDIAC MARKERS ( 13 Jul 2020 18:49 )  <0.015 ng/mL / x     / x     / x     / x        Head CT  There is no evidence of intraparenchymal or extraaxial hemorrhage.   There is no CT evidence of large vessel acute infarct. No mass effect is found in the brain.  No evidence of midline shift or herniation pattern.  The ventricles, sulci and basal cisterns appear unremarkable.  Visualized paranasal sinuses are clear.    IMPRESSION:     No acute intracranial findings.    Impression  86 year old man with HTN on B-blocker( metoprolol ) and ARB who presents to the ED with persistent dizziness, palpitations, sinus bradycardia for the past 2 weeks. There is no chronotropic response to activity with HR. He denies any active chest pain and his dizziness is at baseline. In addition, there is incidental finding of moderately severe hyponatremia likely in the setting of dehydration and MARTITA (+/- CKD).    A/P  Symptomatic sinus bradycardia  - hemodynamically stable  - Admit to telemetry and closely monitor tele and vital signs  - Serial cardiac enzymes  - Hold beta blocker for now for HR   - Substitute BP meds from another class of meds  - ECHO in AM; lipid panel; TSH/T4; A1c  - Cardiology consult    Moderately severe hyponatremia  - Unsure acuity of presentation  - Last normal labs seen here about 4 years ago  - Will obtain urine osmolality and Na/ Cr and serum osmolality.  - Will  correct slowly with isotonic fluid  - based on urine lytes - appears related to hypotonic hyponatremia from dehydration/hypovolemia  - Will replace with isotonic saline fluids    MARTITA ( +/_ CKD)  - Will hydrate and recheck labs post evaluation of hyponatremia etiology  - Obtain renal U/S  - Consider nephrology consult  -

## 2020-07-14 LAB
A1C WITH ESTIMATED AVERAGE GLUCOSE RESULT: 5.5 % — SIGNIFICANT CHANGE UP (ref 4–5.6)
ALBUMIN SERPL ELPH-MCNC: 3.2 G/DL — LOW (ref 3.5–5)
ALP SERPL-CCNC: 42 U/L — SIGNIFICANT CHANGE UP (ref 40–120)
ALT FLD-CCNC: 37 U/L DA — SIGNIFICANT CHANGE UP (ref 10–60)
ANION GAP SERPL CALC-SCNC: 10 MMOL/L — SIGNIFICANT CHANGE UP (ref 5–17)
ANION GAP SERPL CALC-SCNC: 5 MMOL/L — SIGNIFICANT CHANGE UP (ref 5–17)
AST SERPL-CCNC: 36 U/L — SIGNIFICANT CHANGE UP (ref 10–40)
BASOPHILS # BLD AUTO: 0.01 K/UL — SIGNIFICANT CHANGE UP (ref 0–0.2)
BASOPHILS NFR BLD AUTO: 0.1 % — SIGNIFICANT CHANGE UP (ref 0–2)
BILIRUB SERPL-MCNC: 0.5 MG/DL — SIGNIFICANT CHANGE UP (ref 0.2–1.2)
BUN SERPL-MCNC: 30 MG/DL — HIGH (ref 7–18)
BUN SERPL-MCNC: 39 MG/DL — HIGH (ref 7–18)
CALCIUM SERPL-MCNC: 8.3 MG/DL — LOW (ref 8.4–10.5)
CALCIUM SERPL-MCNC: 8.6 MG/DL — SIGNIFICANT CHANGE UP (ref 8.4–10.5)
CHLORIDE SERPL-SCNC: 93 MMOL/L — LOW (ref 96–108)
CHLORIDE SERPL-SCNC: 98 MMOL/L — SIGNIFICANT CHANGE UP (ref 96–108)
CHOLEST SERPL-MCNC: 117 MG/DL — SIGNIFICANT CHANGE UP (ref 10–199)
CK SERPL-CCNC: 869 U/L — HIGH (ref 35–232)
CO2 SERPL-SCNC: 24 MMOL/L — SIGNIFICANT CHANGE UP (ref 22–31)
CO2 SERPL-SCNC: 27 MMOL/L — SIGNIFICANT CHANGE UP (ref 22–31)
CREAT SERPL-MCNC: 1.54 MG/DL — HIGH (ref 0.5–1.3)
CREAT SERPL-MCNC: 1.83 MG/DL — HIGH (ref 0.5–1.3)
EOSINOPHIL # BLD AUTO: 0.07 K/UL — SIGNIFICANT CHANGE UP (ref 0–0.5)
EOSINOPHIL NFR BLD AUTO: 0.8 % — SIGNIFICANT CHANGE UP (ref 0–6)
ESTIMATED AVERAGE GLUCOSE: 111 MG/DL — SIGNIFICANT CHANGE UP (ref 68–114)
FERRITIN SERPL-MCNC: 677 NG/ML — HIGH (ref 30–400)
FOLATE SERPL-MCNC: 19.3 NG/ML — SIGNIFICANT CHANGE UP
GLUCOSE SERPL-MCNC: 85 MG/DL — SIGNIFICANT CHANGE UP (ref 70–99)
GLUCOSE SERPL-MCNC: 91 MG/DL — SIGNIFICANT CHANGE UP (ref 70–99)
HCT VFR BLD CALC: 32.7 % — LOW (ref 39–50)
HDLC SERPL-MCNC: 67 MG/DL — SIGNIFICANT CHANGE UP
HGB BLD-MCNC: 11.5 G/DL — LOW (ref 13–17)
IMM GRANULOCYTES NFR BLD AUTO: 0.3 % — SIGNIFICANT CHANGE UP (ref 0–1.5)
IRON SATN MFR SERPL: 26 % — SIGNIFICANT CHANGE UP (ref 20–55)
IRON SATN MFR SERPL: 60 UG/DL — LOW (ref 65–170)
LIPID PNL WITH DIRECT LDL SERPL: 37 MG/DL — SIGNIFICANT CHANGE UP
LYMPHOCYTES # BLD AUTO: 1.53 K/UL — SIGNIFICANT CHANGE UP (ref 1–3.3)
LYMPHOCYTES # BLD AUTO: 17.7 % — SIGNIFICANT CHANGE UP (ref 13–44)
MAGNESIUM SERPL-MCNC: 2 MG/DL — SIGNIFICANT CHANGE UP (ref 1.6–2.6)
MCHC RBC-ENTMCNC: 30.7 PG — SIGNIFICANT CHANGE UP (ref 27–34)
MCHC RBC-ENTMCNC: 35.2 GM/DL — SIGNIFICANT CHANGE UP (ref 32–36)
MCV RBC AUTO: 87.2 FL — SIGNIFICANT CHANGE UP (ref 80–100)
MONOCYTES # BLD AUTO: 0.83 K/UL — SIGNIFICANT CHANGE UP (ref 0–0.9)
MONOCYTES NFR BLD AUTO: 9.6 % — SIGNIFICANT CHANGE UP (ref 2–14)
NEUTROPHILS # BLD AUTO: 6.17 K/UL — SIGNIFICANT CHANGE UP (ref 1.8–7.4)
NEUTROPHILS NFR BLD AUTO: 71.5 % — SIGNIFICANT CHANGE UP (ref 43–77)
NRBC # BLD: 0 /100 WBCS — SIGNIFICANT CHANGE UP (ref 0–0)
OSMOLALITY SERPL: 271 MOSMOL/KG — LOW (ref 280–301)
PHOSPHATE SERPL-MCNC: 3.7 MG/DL — SIGNIFICANT CHANGE UP (ref 2.5–4.5)
PLATELET # BLD AUTO: 172 K/UL — SIGNIFICANT CHANGE UP (ref 150–400)
POTASSIUM SERPL-MCNC: 4.2 MMOL/L — SIGNIFICANT CHANGE UP (ref 3.5–5.3)
POTASSIUM SERPL-MCNC: 4.4 MMOL/L — SIGNIFICANT CHANGE UP (ref 3.5–5.3)
POTASSIUM SERPL-SCNC: 4.2 MMOL/L — SIGNIFICANT CHANGE UP (ref 3.5–5.3)
POTASSIUM SERPL-SCNC: 4.4 MMOL/L — SIGNIFICANT CHANGE UP (ref 3.5–5.3)
PROT SERPL-MCNC: 6.5 G/DL — SIGNIFICANT CHANGE UP (ref 6–8.3)
RBC # BLD: 3.75 M/UL — LOW (ref 4.2–5.8)
RBC # FLD: 12.8 % — SIGNIFICANT CHANGE UP (ref 10.3–14.5)
SARS-COV-2 IGG SERPL QL IA: NEGATIVE — SIGNIFICANT CHANGE UP
SARS-COV-2 IGM SERPL IA-ACNC: <0.1 INDEX — SIGNIFICANT CHANGE UP
SARS-COV-2 RNA SPEC QL NAA+PROBE: SIGNIFICANT CHANGE UP
SODIUM SERPL-SCNC: 127 MMOL/L — LOW (ref 135–145)
SODIUM SERPL-SCNC: 130 MMOL/L — LOW (ref 135–145)
T4 AB SER-ACNC: 7 UG/DL — SIGNIFICANT CHANGE UP (ref 4.6–12)
T4 FREE SERPL-MCNC: 1.5 NG/DL — SIGNIFICANT CHANGE UP (ref 0.9–1.8)
TIBC SERPL-MCNC: 227 UG/DL — LOW (ref 250–450)
TOTAL CHOLESTEROL/HDL RATIO MEASUREMENT: 1.7 RATIO — LOW (ref 3.4–9.6)
TRIGL SERPL-MCNC: 63 MG/DL — SIGNIFICANT CHANGE UP (ref 10–149)
TROPONIN I SERPL-MCNC: 0.01 NG/ML — SIGNIFICANT CHANGE UP (ref 0–0.04)
TROPONIN I SERPL-MCNC: <0.015 NG/ML — SIGNIFICANT CHANGE UP (ref 0–0.04)
TSH SERPL-MCNC: 1.44 UU/ML — SIGNIFICANT CHANGE UP (ref 0.34–4.82)
UIBC SERPL-MCNC: 167 UG/DL — SIGNIFICANT CHANGE UP (ref 110–370)
UUN UR-MCNC: 645 MG/DL — SIGNIFICANT CHANGE UP
VIT B12 SERPL-MCNC: 1072 PG/ML — SIGNIFICANT CHANGE UP (ref 232–1245)
WBC # BLD: 8.64 K/UL — SIGNIFICANT CHANGE UP (ref 3.8–10.5)
WBC # FLD AUTO: 8.64 K/UL — SIGNIFICANT CHANGE UP (ref 3.8–10.5)

## 2020-07-14 PROCEDURE — 99233 SBSQ HOSP IP/OBS HIGH 50: CPT | Mod: GC

## 2020-07-14 PROCEDURE — 99223 1ST HOSP IP/OBS HIGH 75: CPT

## 2020-07-14 RX ORDER — LOSARTAN POTASSIUM 100 MG/1
25 TABLET, FILM COATED ORAL ONCE
Refills: 0 | Status: COMPLETED | OUTPATIENT
Start: 2020-07-14 | End: 2020-07-14

## 2020-07-14 RX ORDER — SODIUM CHLORIDE 9 MG/ML
1000 INJECTION INTRAMUSCULAR; INTRAVENOUS; SUBCUTANEOUS
Refills: 0 | Status: DISCONTINUED | OUTPATIENT
Start: 2020-07-14 | End: 2020-07-14

## 2020-07-14 RX ORDER — SODIUM CHLORIDE 9 MG/ML
1000 INJECTION, SOLUTION INTRAVENOUS
Refills: 0 | Status: DISCONTINUED | OUTPATIENT
Start: 2020-07-14 | End: 2020-07-15

## 2020-07-14 RX ORDER — LOSARTAN POTASSIUM 100 MG/1
25 TABLET, FILM COATED ORAL DAILY
Refills: 0 | Status: DISCONTINUED | OUTPATIENT
Start: 2020-07-14 | End: 2020-07-14

## 2020-07-14 RX ORDER — LOSARTAN POTASSIUM 100 MG/1
50 TABLET, FILM COATED ORAL DAILY
Refills: 0 | Status: DISCONTINUED | OUTPATIENT
Start: 2020-07-15 | End: 2020-07-15

## 2020-07-14 RX ADMIN — HEPARIN SODIUM 5000 UNIT(S): 5000 INJECTION INTRAVENOUS; SUBCUTANEOUS at 07:30

## 2020-07-14 RX ADMIN — LOSARTAN POTASSIUM 25 MILLIGRAM(S): 100 TABLET, FILM COATED ORAL at 18:14

## 2020-07-14 RX ADMIN — ATORVASTATIN CALCIUM 10 MILLIGRAM(S): 80 TABLET, FILM COATED ORAL at 23:16

## 2020-07-14 RX ADMIN — PANTOPRAZOLE SODIUM 40 MILLIGRAM(S): 20 TABLET, DELAYED RELEASE ORAL at 07:30

## 2020-07-14 RX ADMIN — LOSARTAN POTASSIUM 25 MILLIGRAM(S): 100 TABLET, FILM COATED ORAL at 23:16

## 2020-07-14 RX ADMIN — SODIUM CHLORIDE 100 MILLILITER(S): 9 INJECTION INTRAMUSCULAR; INTRAVENOUS; SUBCUTANEOUS at 00:16

## 2020-07-14 RX ADMIN — HEPARIN SODIUM 5000 UNIT(S): 5000 INJECTION INTRAVENOUS; SUBCUTANEOUS at 18:14

## 2020-07-14 RX ADMIN — TAMSULOSIN HYDROCHLORIDE 0.4 MILLIGRAM(S): 0.4 CAPSULE ORAL at 23:16

## 2020-07-14 NOTE — ED ADULT NURSE REASSESSMENT NOTE - NS ED NURSE REASSESS COMMENT FT1
HR mostly on 38- 46 / min , with pacer on at 60/min, 80mamps demand as per order. Pt well rested no complaints. HR mostly on 38- 46 / min , with pacer on at 60/min, 10mamps demand as per order. Pt well rested no complaints. HR on 60-62/ min , with pacer on at 60/min, 10mamps demand as per order. Pt well rested no complaints. HR on 38-42/ min , with pacer standby  at bedside per order. Pt well rested no complaints. HR on 38-42/ min , with pacer standby  at bedside per order. Pt well rested no complaints. Atropine at bedside to be given for persistent HR below 40 with symptoms as per Dr Garcia.

## 2020-07-14 NOTE — CONSULT NOTE ADULT - ASSESSMENT
CHIEF COMPLAINT: lightheadedness and nausea for 2 weeks    HPI: 85 yo Cantonese-speaking M with HTN, HLD, and vertigo who presented with LH in the setting of hyponatremia (Na = 120). Patient also noted to be bradycardic, for which cardiology was called. Patient     PAST MEDICAL & SURGICAL HISTORY:  As above, also BPH (benign prostatic hyperplasia), Gout, GERD (gastroesophageal reflux disease)  No significant past surgical history    Allergies    No Known Drug Allergies  shellfish (Other)    MEDICATIONS  (STANDING):  atorvastatin 10 milliGRAM(s) Oral at bedtime  atropine Injectable 0.5 milliGRAM(s) IV Push once  heparin   Injectable 5000 Unit(s) SubCutaneous every 12 hours  pantoprazole    Tablet 40 milliGRAM(s) Oral before breakfast  tamsulosin 0.4 milliGRAM(s) Oral at bedtime    MEDICATIONS  (PRN):      FAMILY HISTORY:  No pertinent family history in first degree relatives    No family history of premature coronary artery disease or sudden cardiac death    SOCIAL HISTORY:  Smoking-  Alcohol-  Illicit Drug use-    REVIEW OF SYSTEMS:  Constitutional: [ ] fever, [ ]weight loss,  [ ]fatigue  Eyes: [ ] visual changes  Respiratory: [ ]shortness of breath;  [ ] cough, [ ]wheezing, [ ]chills, [ ]hemoptysis  Cardiovascular: [ ] chest pain, [ ]palpitations, [ ]dizziness,  [ ]leg swelling [ ]syncope  Gastrointestinal: [ ] abdominal pain, [ ]nausea, [ ]vomiting,  [ ]diarrhea   Genitourinary: [ ] dysuria, [ ] hematuria  Neurologic: [ ] headaches [ ] tremors  [ ] weakness [ ] lightheadedness  Skin: [ ] itching, [ ]burning, [ ] rashes  Endocrine: [ ] heat or cold intolerance  Musculoskeletal: [ ] joint pain or swelling; [ ] muscle, back, or extremity pain  Psychiatric: [ ] depression, [ ]anxiety, [ ]mood swings, or [ ]difficulty sleeping  Hematologic: [ ] easy bruising, [ ] bleeding gums       [ x] All others negative	  [ ] Unable to obtain    Vital Signs Last 24 Hrs  T(C): 36.7 (14 Jul 2020 07:52), Max: 36.9 (13 Jul 2020 17:59)  T(F): 98.1 (14 Jul 2020 07:52), Max: 98.4 (13 Jul 2020 17:59)  HR: 58 (14 Jul 2020 07:52) (40 - 71)  BP: 159/67 (14 Jul 2020 07:52) (118/57 - 171/45)  BP(mean): --  RR: 18 (14 Jul 2020 07:52) (15 - 18)  SpO2: 96% (14 Jul 2020 07:52) (96% - 100%)  I&O's Summary      PHYSICAL EXAM:  General: No acute distress  HEENT: EOMI, PERRL  Neck: Supple, No JVD  Lungs: Clear to auscultation bilaterally; No rales or wheezing  Heart: Regular rate and rhythm; No murmurs, rubs, or gallops  Abdomen: Nontender, bowel sounds present  Extremities: No clubbing, cyanosis, or edema  Nervous system:  Alert & Oriented X3, no focal deficits  Psychiatric: Normal affect  Skin: No rashes or lesions      LABS:  07-14    127<L>  |  93<L>  |  39<H>  ----------------------------<  85  4.2   |  24  |  1.83<H>    Ca    8.3<L>      14 Jul 2020 06:35  Phos  3.7     07-14  Mg     2.0     07-14    TPro  6.5  /  Alb  3.2<L>  /  TBili  0.5  /  DBili  x   /  AST  36  /  ALT  37  /  AlkPhos  42  07-14    Creatinine Trend: 1.83<--, 2.40<--                        11.5   8.64  )-----------( 172      ( 14 Jul 2020 06:35 )             32.7     Lipid Panel: Cholesterol, Serum 117  Direct LDL 37  HDL Cholesterol, Serum 67  Triglycerides, Serum 63    Cardiac Enzymes: CARDIAC MARKERS ( 14 Jul 2020 06:35 )  0.015 ng/mL / x     / 869 U/L / x     / x      CARDIAC MARKERS ( 13 Jul 2020 18:49 )  <0.015 ng/mL / x     / x     / x     / x          RADIOLOGY: < from: Xray Chest 2 Views PA/Lat (07.13.20 @ 19:06) >  IMPRESSION:  Clear lungs.    < end of copied text >    < from: CT Head No Cont (07.13.20 @ 19:06) >  IMPRESSION:       No acute intracranial findings.    < end of copied text >      ECG [my interpretation]: 7/13/2020 @ 18:26: Sinus bradycardia at 40 bpm    TELEMETRY:    ECHO: < from: Transthoracic Echocardiogram (03.08.16 @ 07:53) >  Conclusions:  1. Mild-moderate mitral regurgitation.  2.  Mild aortic insufficiency.  3. Normal Left Ventricular Systolic Function,  (EF = 55 to  60%)  4. Grade II diastolic dysfunction  5. RV systolic pressure is moderately increased.=48mmhg  6. There is mild-moderate tricuspid regurgitation.    < end of copied text >      STRESS TEST: < from: Nuclear Stress Test-Exercise (03.08.16 @ 13:01) >  IMPRESSIONS:Normal Study  * Exercise capacity: 10 METS, Excellent for age and  gender.  * Chest Pain: No chest pain with exercise.  * HR Response: Appropriate.  * BP Response: Appropriate with baseline hypertension.  * Negative ECG evidence of ischemia during exercise stress  test.  * Myocardial Perfusion SPECT results are normal at 83 % of  MPHR.  * No clear evidence of ischemia or infarct.  * Post-stress resting myocardial perfusion gated SPECT  imaging was performed (LVEF > 70%) with no regional wall  motion abnormalities.    < end of copied text >      ============================================================  ASSESSMENT and PLAN:    85 yo Cantonese-speaking M with HTN, HLD, and vertigo who presented with LH in the setting of hyponatremia, also noted to be bradycardic.     1. Sinus bradycardia: Unlikely to be related to patient's symptoms since his BP is always high even in the setting of bradycardia (maintains cerebral perfusion), and upon review of charted HR, the HR goes up to the 70s and comes down, all without relation to his symptoms. furthermore, patient ran on the treadmill in 2016 without evidence of chronotropic incompetence.   -Suspect this is physiologic sinus bradycardia due to excess vagal tone in setting of nausea and lightheadedness, likely from hyponatremia   -Doubt beta blocker overdose given HR variability  -Reasonable to repeat echocardiogram to see if any intervening changes from prior, though do not expect significant changes    2. HTN: On candesartan and metoprolol at home, holding the latter due to bradycardia    3. HLD: On atorvastatin    ***Note that this is a preliminary note and any recommendations should NOT be carried out until this note is finalized. *** CHIEF COMPLAINT: lightheadedness and nausea for 2 weeks    HPI: 87 yo Cantonese-speaking M with HTN, HLD, and vertigo who presented with LH in the setting of hyponatremia (Na = 120). Patient also noted to be bradycardic, for which cardiology was called. Patient reports he has been feeling lightheadedness on and off for the past few weeks. He had a near-fall at home when he tripped over something and stumbled forward. He managed to reach out with his hand and steady himself, without actually falling. No head injury, no LOC. Poor PO intake and no sodium intake at home due to concern for HTN. Of note, patient specifically did not feel any symptoms including lightheadedness or dyspnea from midnight last night up until now, inclusive, despite episodes of HR in the 30s-40s on telemetry. No palpitations or CP. No syncopal episodes. Patient has been home-bound for the past 4 months due to COVID.     Translation provided by patient's daughter May Rojas over the phone per patient preference, though he is able to speak a satisfactory level of English at baseline.     PAST MEDICAL & SURGICAL HISTORY:  As above, also BPH (benign prostatic hyperplasia), Gout, GERD (gastroesophageal reflux disease)  No significant past surgical history    Allergies    No Known Drug Allergies  shellfish (Other)    MEDICATIONS  (STANDING):  atorvastatin 10 milliGRAM(s) Oral at bedtime  atropine Injectable 0.5 milliGRAM(s) IV Push once  heparin   Injectable 5000 Unit(s) SubCutaneous every 12 hours  pantoprazole    Tablet 40 milliGRAM(s) Oral before breakfast  tamsulosin 0.4 milliGRAM(s) Oral at bedtime    MEDICATIONS  (PRN):      FAMILY HISTORY:  No pertinent family history in first degree relatives    No family history of premature coronary artery disease or sudden cardiac death    SOCIAL HISTORY:  Smoking-Never  Alcohol-Denies  Illicit Drug use- Denies    REVIEW OF SYSTEMS:  Constitutional: [ ] fever, [ ]weight loss,  [ ]fatigue  Eyes: [ ] visual changes  Respiratory: [ ]shortness of breath;  [ ] cough, [ ]wheezing, [ ]chills, [ ]hemoptysis  Cardiovascular: [ ] chest pain, [ ]palpitations, [ ]dizziness,  [ ]leg swelling [ ]syncope  Gastrointestinal: [ ] abdominal pain, [ ]nausea, [ ]vomiting,  [ ]diarrhea   Genitourinary: [ ] dysuria, [ ] hematuria  Neurologic: [ ] headaches [ ] tremors  [ ] weakness [ ] lightheadedness  Skin: [ ] itching, [ ]burning, [ ] rashes  Endocrine: [ ] heat or cold intolerance  Musculoskeletal: [ ] joint pain or swelling; [ ] muscle, back, or extremity pain  Psychiatric: [ ] depression, [ ]anxiety, [ ]mood swings, or [ ]difficulty sleeping  Hematologic: [ ] easy bruising, [ ] bleeding gums       [ x] All others negative	  [ ] Unable to obtain    Vital Signs Last 24 Hrs  T(C): 36.7 (14 Jul 2020 07:52), Max: 36.9 (13 Jul 2020 17:59)  T(F): 98.1 (14 Jul 2020 07:52), Max: 98.4 (13 Jul 2020 17:59)  HR: 58 (14 Jul 2020 07:52) (40 - 71)  BP: 159/67 (14 Jul 2020 07:52) (118/57 - 171/45)  BP(mean): --  RR: 18 (14 Jul 2020 07:52) (15 - 18)  SpO2: 96% (14 Jul 2020 07:52) (96% - 100%)  I&O's Summary      PHYSICAL EXAM:  General: No acute distress  HEENT: EOMI, PERRL  Neck: Supple, No JVD  Lungs: Clear to auscultation bilaterally; No rales or wheezing  Heart: Regular rate and rhythm; No murmurs, rubs, or gallops  Abdomen: Nontender, bowel sounds present  Extremities: No clubbing, cyanosis, or edema  Nervous system:  Alert & Oriented X3, no focal deficits  Psychiatric: Normal affect  Skin: No rashes or lesions      LABS:  07-14    127<L>  |  93<L>  |  39<H>  ----------------------------<  85  4.2   |  24  |  1.83<H>    Ca    8.3<L>      14 Jul 2020 06:35  Phos  3.7     07-14  Mg     2.0     07-14    TPro  6.5  /  Alb  3.2<L>  /  TBili  0.5  /  DBili  x   /  AST  36  /  ALT  37  /  AlkPhos  42  07-14    Creatinine Trend: 1.83<--, 2.40<--                        11.5   8.64  )-----------( 172      ( 14 Jul 2020 06:35 )             32.7     Lipid Panel: Cholesterol, Serum 117  Direct LDL 37  HDL Cholesterol, Serum 67  Triglycerides, Serum 63    Cardiac Enzymes: CARDIAC MARKERS ( 14 Jul 2020 06:35 )  0.015 ng/mL / x     / 869 U/L / x     / x      CARDIAC MARKERS ( 13 Jul 2020 18:49 )  <0.015 ng/mL / x     / x     / x     / x          RADIOLOGY: < from: Xray Chest 2 Views PA/Lat (07.13.20 @ 19:06) >  IMPRESSION:  Clear lungs.    < end of copied text >    < from: CT Head No Cont (07.13.20 @ 19:06) >  IMPRESSION:       No acute intracranial findings.    < end of copied text >      ECG [my interpretation]: 7/13/2020 @ 18:26: Sinus bradycardia at 40 bpm    TELEMETRY: Sinus rhythm in the 50s currently, with HR ranging from lows in the 30s (2.2-second pause at 3AM) to highs in the 70s-80s.      ECHO: < from: Transthoracic Echocardiogram (03.08.16 @ 07:53) >  Conclusions:  1. Mild-moderate mitral regurgitation.  2.  Mild aortic insufficiency.  3. Normal Left Ventricular Systolic Function,  (EF = 55 to  60%)  4. Grade II diastolic dysfunction  5. RV systolic pressure is moderately increased.=48mmhg  6. There is mild-moderate tricuspid regurgitation.    < end of copied text >      STRESS TEST: < from: Nuclear Stress Test-Exercise (03.08.16 @ 13:01) >  IMPRESSIONS:Normal Study  * Exercise capacity: 10 METS, Excellent for age and  gender.  * Chest Pain: No chest pain with exercise.  * HR Response: Appropriate.  * BP Response: Appropriate with baseline hypertension.  * Negative ECG evidence of ischemia during exercise stress  test.  * Myocardial Perfusion SPECT results are normal at 83 % of  MPHR.  * No clear evidence of ischemia or infarct.  * Post-stress resting myocardial perfusion gated SPECT  imaging was performed (LVEF > 70%) with no regional wall  motion abnormalities.    < end of copied text >      ============================================================  ASSESSMENT and PLAN:    87 yo Cantonese-speaking M with HTN, HLD, and vertigo who presented with LH in the setting of hyponatremia, also noted to be bradycardic.     1. Sinus bradycardia: Unlikely to be related to patient's symptoms since his BP is always high even in the setting of bradycardia (maintains cerebral perfusion), and upon review of charted HR and telemetry, the HR goes up to the 70s and comes down, all without relation to his symptoms. Furthermore, patient ran on the treadmill in 2016 without evidence of chronotropic incompetence.   -Suspect this is physiologic sinus bradycardia due to excess vagal tone in setting of nausea and lightheadedness, likely from hyponatremia   -Doubt beta blocker overdose given HR variability  -Reasonable to repeat echocardiogram to see if any intervening changes from prior, though do not expect significant changes    2. HTN: On candesartan and metoprolol at home, holding the latter due to bradycardia    3. HLD: On atorvastatin

## 2020-07-14 NOTE — PROGRESS NOTE ADULT - ASSESSMENT
81 y/o male, from home,  Cantonese speaking with PMH of HTN on metoprol, BPH, GERD, chronic vertigo who presented with persistent dizziness for the past 2 weeks with intermittent chest discomfort.   In the ED pt found to be  bradycardic.  EKG showed  sinus bradycardia in the 40s. Sodium level 120  Pt admitted to telemetry with symptomatic sinus bradycardia, hyponatremia and MARTITA   Troponin negative   Pt seen at bedside, alert, awake, denies chest pain, SOB, palpitation

## 2020-07-14 NOTE — PROGRESS NOTE ADULT - PROBLEM SELECTOR PLAN 1
telemetry monitoring   vagal response vs beta blocker overdose   now improving HR above 50   Hold beta blocker for now   f/u ECHO   Cardiology consulted Dr. Zhong

## 2020-07-14 NOTE — CONSULT NOTE ADULT - ATTENDING COMMENTS
[facility] NEPHROLOGY  Mark Anthony Huffman M.D.  Calixto Jeffery D.O.  Bonnie Brown M.D.  Nuria Hollis, MSN, ANP-C  [phone]    [address] [place] [address]
Thank you for the courtesy of a consultation, please contact me for any additional questions

## 2020-07-14 NOTE — PATIENT PROFILE ADULT - LANGUAGE ASSISTANCE NEEDED
Patient speaks/understands English - requests daughter to do any interpretation/No-Patient/Caregiver offered and refused free interpretation services.

## 2020-07-14 NOTE — CHART NOTE - NSCHARTNOTEFT_GEN_A_CORE
Rn notified call team of SBPs 170s. Gave 25mg losartan stat and standing 50mg losartan Qd beginning tomorrow. Will continue to monitor.

## 2020-07-14 NOTE — PROGRESS NOTE ADULT - PROBLEM SELECTOR PLAN 4
Cr 2.4 on admission, improved with fluids   monitor BMP  hold losartan for now   avoid any nephrotoxic meds and iv contrast  renal US

## 2020-07-14 NOTE — ED ADULT NURSE REASSESSMENT NOTE - NS ED NURSE REASSESS COMMENT FT1
pt on cont. cardiac monitor with demand pacer as ordered, will cont care., Denies any shortness of breath and dizziness.

## 2020-07-14 NOTE — PROGRESS NOTE ADULT - PROBLEM SELECTOR PLAN 3
Na LEVEL 120 likely dehydration (hx of no salt intake as per daughter)   Na improved to 127 with hydration   on candesartan at home  regular diet  monitor bmp  hold IV fluids for now and repeat sodium

## 2020-07-14 NOTE — CONSULT NOTE ADULT - SUBJECTIVE AND OBJECTIVE BOX
[facility] NEPHROLOGY- CONSULTATION NOTE  As per pt's preference, use daughter Samantha via telephone to translate    Patient is a 86y Cantonese speaking Male with HTN on Metoprolol/ Candesartan, HLD, GERD, BPH, vertigo p/w dizziness, frequent falls, chest pain and nausea. Pt found to be bradycardic with elevated serum creatinine and hyponatremia. Nephrology consulted for Elevated serum creatinine/ Hyponatremia.     Pt c/o chest pain with nausea. Pt denies any vomiting, diarrhea, or diuretic use. Pt c/o dysuria but denies any hematuria, SOB, or LE edema.   As per daughter, he is very strict with his intake and only drinks 6 cups of warm/hot water (small cup about 3-4 oz) and refused to eat any salt. He hasn't left the house in the past 4 months. He has been having fluctuating BP- very high and then very low but could not give me actual readings.   Patient denies any NSAID use, recent CT with contrast, hepatitis or blood transfusions.     PAST MEDICAL & SURGICAL HISTORY:  BPH (benign prostatic hyperplasia)  Gout  GERD (gastroesophageal reflux disease)  HTN (hypertension)  No significant past surgical history    No Known Drug Allergies  shellfish (Other)    Home Medications Reviewed  Hospital Medications:   MEDICATIONS  (STANDING):  atorvastatin 10 milliGRAM(s) Oral at bedtime  atropine Injectable 0.5 milliGRAM(s) IV Push once  dextrose 5%. 1000 milliLiter(s) (40 mL/Hr) IV Continuous <Continuous>  heparin   Injectable 5000 Unit(s) SubCutaneous every 12 hours  losartan 25 milliGRAM(s) Oral daily  pantoprazole    Tablet 40 milliGRAM(s) Oral before breakfast  tamsulosin 0.4 milliGRAM(s) Oral at bedtime    FAMILY HISTORY:  No pertinent family history in first degree relatives      REVIEW OF SYSTEMS:  Gen: no changes in weight, +dizziness  HEENT: no rhinorrhea  Neck: no sore throat  Cards: +chest pain  Resp: no dyspnea  GI: +nausea  but no vomiting or diarrhea  : +dysuria No hematuria  Vascular: no LE edema  Derm: no rashes  Neuro: no numbness/tingling  All other review of systems is negative unless indicated above.    VITALS:  T(F): 98 (20 @ 18:10), Max: 98.1 (20 @ 07:52)  HR: 61 (20 @ 18:10)  BP: 148/55 (20 @ 18:10)  RR: 20 (20 @ 18:10)  SpO2: 98% (20 @ 18:10)  Wt(kg): --      PHYSICAL EXAM:  Gen: NAD, calm  HEENT: MMM  Neck: no JVD  Cards: mercy, +S1/S2,   Resp: CTA B/L  GI: soft, NT/ND, NABS  : no CVA tenderness  Extremities: no LE edema B/L  Derm: no rashes  Neuro: non-focal    LABS:    130 <--, 127 <--, 120 <--  130<L>  |  98  |  30<H>  ----------------------------<  91  4.4   |  27  |  1.54<H>    Ca    8.6      2020 14:45  Phos  3.7       Mg     2.0         TPro  6.5  /  Alb  3.2<L>  /  TBili  0.5  /  DBili      /  AST  36  /  ALT  37  /  AlkPhos  42      Creatinine Trend: 1.54 <--, 1.83 <--, 2.40 <--                        11.5   8.64  )-----------( 172      ( 2020 06:35 )             32.7     Urine Studies:  Urinalysis Basic - ( 2020 22:11 )    Color: Yellow / Appearance: Clear / S.020 / pH:   Gluc:  / Ketone: Negative  / Bili: Negative / Urobili: Negative   Blood:  / Protein: 15 / Nitrite: Negative   Leuk Esterase: Negative / RBC: 2-5 /HPF / WBC 0-2 /HPF   Sq Epi:  / Non Sq Epi: Few /HPF / Bacteria: Trace /HPF      Chloride, Random Urine: <10 mmol/L ( @ 22:11)  Creatinine, Random Urine: 122 mg/dL ( @ 22:11)  Sodium, Random Urine: 12 mmol/L ( @ 22:11)  Osmolality, Random Urine: 372 mos/kg ( @ 22:09)    RADIOLOGY & ADDITIONAL STUDIES:        < from: Xray Chest 2 Views PA/Lat (07.13.20 @ 19:06) >  EXAM:  XR CHEST PA LAT 2V                            PROCEDURE DATE:  2020          INTERPRETATION:  CLINICAL INDICATION: Chest pain.    TECHNIQUE: PA and lateral chest from 2020 at 6:34 PM    COMPARISON: Prior chest radiographs dated 3/6/2016    FINDINGS:  The lungs remain clear of any focal airspace opacity.  There is no pleural effusion or pneumothorax.   The heart is mildly enlarged.  The osseous structures are unremarkable.    IMPRESSION:  Clear lungs.    < end of copied text >

## 2020-07-14 NOTE — CONSULT NOTE ADULT - ASSESSMENT
PATIENT SEEN AND EXAMINED: FULL NOTE TO FOLLOW.  Check repeat BMP stat. Avoid overcorrection of serum Na   Will start D5 if overcorrecting. ; Goal 6-8 meq in 24 hrs. Patient is a 86y Cantonese speaking Male with HTN on Metoprolol/ Candesartan, HLD, GERD, BPH, vertigo p/w dizziness, frequent falls, chest pain and nausea. Pt found to be bradycardic with elevated serum creatinine and hyponatremia. Nephrology consulted for Elevated serum creatinine/ Hyponatremia.     1. MARTITA- pre-renal in the setting of nausea with decreased PO intake. Renal function improving with IVF. Ok to c/w ARB for now. Will defer Renal US since MARTITA is resolving. Strict I/Os. Avoid nephrotoxins/ NSAIDs/ RCA. Monitor BMP.  2. Hyponatremia- likely hypovolemic hyponatremia. Pt with rapid increase in Serum Na from 120 to 130 due to NS IVF. Will start  D5 @ 40cc/hr x 6hrs. Repeat BMP q 8hrs. Goal serum Na 126-128. TSH wnl.   3. CKD -3- pt likely with underlying CKD; last SCr 1.2-1.4 in 2016?baseline. Spot Upr/Cr 0.18 on ARB. Will defer secondary w/u as an outpt. Avoid nephrotoxins  4. HTN 2/2 CKD- BP improved with bradycardia. Metoprolol held as per Cards. Pt currently on Losartan 25mg PO qd, can continue for now since renal function in improving. Monitor BP.   5. BPH- c/w flomax

## 2020-07-14 NOTE — PROGRESS NOTE ADULT - PROBLEM SELECTOR PLAN 2
p/w dizziness for 2 week  hx of vertigo EKG shows mercy sinus 40s  telemetry monitoring   stress test In 2016 shows EF >70%  f/u echo with bubble study and carotid dopplers  Dr Zhong cardiology on board

## 2020-07-14 NOTE — PROGRESS NOTE ADULT - SUBJECTIVE AND OBJECTIVE BOX
NP Note Medicine     Patient is a 86y old  Male who presents with a chief complaint of Hyponatremia (2020 12:59)      INTERVAL HPI/OVERNIGHT EVENTS: no new complaints    MEDICATIONS  (STANDING):  atorvastatin 10 milliGRAM(s) Oral at bedtime  atropine Injectable 0.5 milliGRAM(s) IV Push once  heparin   Injectable 5000 Unit(s) SubCutaneous every 12 hours  pantoprazole    Tablet 40 milliGRAM(s) Oral before breakfast  tamsulosin 0.4 milliGRAM(s) Oral at bedtime    MEDICATIONS  (PRN):      __________________________________________________  REVIEW OF SYSTEMS:    CONSTITUTIONAL: No fever,   RESPIRATORY: No cough; No shortness of breath  CARDIOVASCULAR: No chest pain, no palpitations  GASTROINTESTINAL: No pain. No nausea or vomiting; No diarrhea   NEUROLOGICAL: No headache or numbness, no tremors  MUSCULOSKELETAL: No joint pain, no muscle pain         Vital Signs Last 24 Hrs  T(C): 36.7 (2020 07:52), Max: 36.9 (2020 17:59)  T(F): 98.1 (2020 07:52), Max: 98.4 (2020 17:59)  HR: 58 (2020 07:52) (40 - 71)  BP: 159/67 (2020 07:52) (118/57 - 171/45)  BP(mean): --  RR: 18 (2020 07:52) (15 - 18)  SpO2: 96% (2020 07:52) (96% - 100%)    ________________________________________________  PHYSICAL EXAM:  GENERAL: NAD  CHEST/LUNG: Clear to ausculitation bilaterally  HEART: S1 S2  regular; bradycardia in 50s   ABDOMEN: Soft, Nontender, Nondistended; Bowel sounds present  EXTREMITIES: no cyanosis; no edema; no calf tenderness  SKIN: warm and dry; no rash  NERVOUS SYSTEM:  Awake and alert; Oriented  to place, person and time ; no new deficits    _________________________________________________  LABS:                        11.5   8.64  )-----------( 172      ( 2020 06:35 )             32.7     07-14    127<L>  |  93<L>  |  39<H>  ----------------------------<  85  4.2   |  24  |  1.83<H>    Ca    8.3<L>      2020 06:35  Phos  3.7     07-14  Mg     2.0     07-14    TPro  6.5  /  Alb  3.2<L>  /  TBili  0.5  /  DBili  x   /  AST  36  /  ALT  37  /  AlkPhos  42  07-14      Urinalysis Basic - ( 2020 22:11 )    Color: Yellow / Appearance: Clear / S.020 / pH: x  Gluc: x / Ketone: Negative  / Bili: Negative / Urobili: Negative   Blood: x / Protein: 15 / Nitrite: Negative   Leuk Esterase: Negative / RBC: 2-5 /HPF / WBC 0-2 /HPF   Sq Epi: x / Non Sq Epi: Few /HPF / Bacteria: Trace /HPF      CAPILLARY BLOOD GLUCOSE      POCT Blood Glucose.: 134 mg/dL (2020 18:24)        RADIOLOGY & ADDITIONAL TESTS:    Imaging Personally Reviewed:  YES/NO    Consultant(s) Notes Reviewed:   YES/ No    Care Discussed with Consultants :     Plan of care was discussed with patient and /or primary care giver; all questions and concerns were addressed and care was aligned with patient's wishes.

## 2020-07-14 NOTE — PROGRESS NOTE ADULT - ATTENDING COMMENTS
Patient seen and examined earlier this afternoon; Agree with NP A/P above with editing as needed. My independent assessment, findings on exam, diagnosis and plan of care as listed below. Discussed with NP Iva.    81 y/o male, from home,  Cantonese speaking but can speak and understand some English,  with PMH of HTN on Metoprolol, BPH, GERD, chronic vertigo who presented with persistent dizziness for the past 2 weeks with intermittent chest discomfort.   In the ED pt found to be  bradycardic.  EKG showed  sinus bradycardia in the 40s. Sodium level 120  Pt admitted to telemetry with symptomatic sinus bradycardia, hyponatremia and MARTITA     Patient doing well. denies fever, chills, SOB, palpitations, chest pain, nausea, vomiting, diarrhea,. No acute distress noted. As per patient similar complaints many years ago resolved spontaneously.    Vital Signs Last 24 Hrs  T(C): 36.6 (14 Jul 2020 16:13), Max: 36.9 (13 Jul 2020 17:59)  T(F): 97.8 (14 Jul 2020 16:13), Max: 98.4 (13 Jul 2020 17:59)  HR: 61 (14 Jul 2020 16:13) (40 - 71)  BP: 188/63 (14 Jul 2020 16:13) (118/57 - 188/63)  RR: 18 (14 Jul 2020 16:13) (15 - 18)  SpO2: 98% (14 Jul 2020 16:13) (96% - 100%) Patient seen and examined earlier this afternoon; Agree with NP A/P above with editing as needed. My independent assessment, findings on exam, diagnosis and plan of care as listed below. Discussed with NEHEMIAS Enrique.    83 y/o male, from home,  Cantonese speaking but can speak and understand some English,  with PMH of HTN on Metoprolol, BPH, GERD, chronic vertigo who presented with persistent dizziness for the past 2 weeks with intermittent chest discomfort.   In the ED pt found to be  bradycardic.  EKG showed  sinus bradycardia in the 40s. Sodium level 120  Pt admitted to telemetry with symptomatic sinus bradycardia, hyponatremia and MARTITA     Patient doing well. denies fever, chills, SOB, palpitations, chest pain, nausea, vomiting, diarrhea,. No acute distress noted. As per patient similar complaints many years ago resolved spontaneously.    Vital Signs Last 24 Hrs  T(C): 36.6 (14 Jul 2020 16:13), Max: 36.9 (13 Jul 2020 17:59)  T(F): 97.8 (14 Jul 2020 16:13), Max: 98.4 (13 Jul 2020 17:59)  HR: 61 (14 Jul 2020 16:13) (40 - 71)  BP: 188/63 (14 Jul 2020 16:13) (118/57 - 188/63)  RR: 18 (14 Jul 2020 16:13) (15 - 18)  SpO2: 98% (14 Jul 2020 16:13) (96% - 100%)    P/E:  elderly male, appears dehydrated, not in acute distress  Neuro: AAO x3; No focal deficits  CVS: S1S2 present, regular  Resp: BLAE+, No wheeze or Rhonchi  GI: Soft, BS+, NT  Extr: No edema or calf tenderness    Labs:                        11.5   8.64  )-----------( 172      ( 14 Jul 2020 06:35 )             32.7   07-14    130<L>  |  98  |  30<H>  ----------------------------<  91  4.4   |  27  |  1.54<H>    Ca    8.6      14 Jul 2020 14:45  Phos  3.7     07-14  Mg     2.0     07-14    TPro  6.5  /  Alb  3.2<L>  /  TBili  0.5  /  DBili  x   /  AST  36  /  ALT  37  /  AlkPhos  42  07-14    D/D:  Suspected Symptomatic Sinus Bradycardia  Dizziness likely due to volume depletion due to dehydration  MARTITA due to Prerenal azotemia  Hypovolemic hyponatremia resolving well.    Plan: Patient seen and examined earlier this afternoon; Agree with NP A/P above with editing as needed. My independent assessment, findings on exam, diagnosis and plan of care as listed below. Discussed with NEHEMIAS Enrique.    81 y/o male, from home,  Cantonese speaking but can speak and understand some English,  with PMH of HTN on Metoprolol, BPH, GERD, chronic vertigo who presented with persistent dizziness for the past 2 weeks with intermittent chest discomfort.   In the ED pt found to be  bradycardic.  EKG showed  sinus bradycardia in the 40s. Sodium level 120  Pt admitted to telemetry with symptomatic sinus bradycardia, hyponatremia and MARTITA     Patient doing well. denies fever, chills, SOB, palpitations, chest pain, nausea, vomiting, diarrhea,. No acute distress noted. As per patient similar complaints many years ago resolved spontaneously.    Vital Signs Last 24 Hrs  T(C): 36.6 (14 Jul 2020 16:13), Max: 36.9 (13 Jul 2020 17:59)  T(F): 97.8 (14 Jul 2020 16:13), Max: 98.4 (13 Jul 2020 17:59)  HR: 61 (14 Jul 2020 16:13) (40 - 71)  BP: 188/63 (14 Jul 2020 16:13) (118/57 - 188/63)  RR: 18 (14 Jul 2020 16:13) (15 - 18)  SpO2: 98% (14 Jul 2020 16:13) (96% - 100%)    P/E:  elderly male, appears dehydrated, not in acute distress  Neuro: AAO x3; No focal deficits  CVS: S1S2 present, regular  Resp: BLAE+, No wheeze or Rhonchi  GI: Soft, BS+, NT  Extr: No edema or calf tenderness    Labs:                        11.5   8.64  )-----------( 172      ( 14 Jul 2020 06:35 )             32.7   07-14    130<L>  |  98  |  30<H>  ----------------------------<  91  4.4   |  27  |  1.54<H>    Ca    8.6      14 Jul 2020 14:45  Phos  3.7     07-14  Mg     2.0     07-14    TPro  6.5  /  Alb  3.2<L>  /  TBili  0.5  /  DBili  x   /  AST  36  /  ALT  37  /  AlkPhos  42  07-14      D/D:  Suspected Symptomatic Sinus Bradycardia  Dizziness likely due to volume depletion due to dehydration  MARTITA due to Prerenal azotemia  Hypovolemic hyponatremia resolving well.  Uncontrolled HTN likely due to withholding meds (Candesartan)  Hx BPH and Gout    Plan:  Continue Tele; External pacer at bedside;  Hold beta blocker;   Cardio consult noted; Follow up Echo  Patient clearly appears dehydrated on admission with U. Na less than 20 and appropriate rise in Na with fluids.  Na 127 this morning; fluids were discontinued; Repeat na slightly above target   Target rise in Na around 8 in 24 hrs, so d/w Renal agreed with placing Hypotonic solution with D5 for 6 hrs and repeat Na tonight around 10 PM.   BP eolevated; resume Candesartan as MARTITA has significantly improved; Will substitute with Losartan 25 mg daily and titrate up to get BP control.   rest of mgmt as per NP note above  DVT ppx    Discussed with patient findings and plan of care  Discussed with NP Iva who also updated Daughter with clinical status.

## 2020-07-15 ENCOUNTER — TRANSCRIPTION ENCOUNTER (OUTPATIENT)
Age: 85
End: 2020-07-15

## 2020-07-15 LAB
ANION GAP SERPL CALC-SCNC: 10 MMOL/L — SIGNIFICANT CHANGE UP (ref 5–17)
ANION GAP SERPL CALC-SCNC: 8 MMOL/L — SIGNIFICANT CHANGE UP (ref 5–17)
BUN SERPL-MCNC: 22 MG/DL — HIGH (ref 7–18)
BUN SERPL-MCNC: 24 MG/DL — HIGH (ref 7–18)
CALCIUM SERPL-MCNC: 8.8 MG/DL — SIGNIFICANT CHANGE UP (ref 8.4–10.5)
CALCIUM SERPL-MCNC: 8.8 MG/DL — SIGNIFICANT CHANGE UP (ref 8.4–10.5)
CHLORIDE SERPL-SCNC: 98 MMOL/L — SIGNIFICANT CHANGE UP (ref 96–108)
CHLORIDE SERPL-SCNC: 98 MMOL/L — SIGNIFICANT CHANGE UP (ref 96–108)
CO2 SERPL-SCNC: 24 MMOL/L — SIGNIFICANT CHANGE UP (ref 22–31)
CO2 SERPL-SCNC: 25 MMOL/L — SIGNIFICANT CHANGE UP (ref 22–31)
CREAT SERPL-MCNC: 1.36 MG/DL — HIGH (ref 0.5–1.3)
CREAT SERPL-MCNC: 1.47 MG/DL — HIGH (ref 0.5–1.3)
GLUCOSE SERPL-MCNC: 154 MG/DL — HIGH (ref 70–99)
GLUCOSE SERPL-MCNC: 81 MG/DL — SIGNIFICANT CHANGE UP (ref 70–99)
HCT VFR BLD CALC: 34.1 % — LOW (ref 39–50)
HGB BLD-MCNC: 12 G/DL — LOW (ref 13–17)
MCHC RBC-ENTMCNC: 30.8 PG — SIGNIFICANT CHANGE UP (ref 27–34)
MCHC RBC-ENTMCNC: 35.2 GM/DL — SIGNIFICANT CHANGE UP (ref 32–36)
MCV RBC AUTO: 87.7 FL — SIGNIFICANT CHANGE UP (ref 80–100)
NRBC # BLD: 0 /100 WBCS — SIGNIFICANT CHANGE UP (ref 0–0)
PLATELET # BLD AUTO: 180 K/UL — SIGNIFICANT CHANGE UP (ref 150–400)
POTASSIUM SERPL-MCNC: 4.1 MMOL/L — SIGNIFICANT CHANGE UP (ref 3.5–5.3)
POTASSIUM SERPL-MCNC: 4.3 MMOL/L — SIGNIFICANT CHANGE UP (ref 3.5–5.3)
POTASSIUM SERPL-SCNC: 4.1 MMOL/L — SIGNIFICANT CHANGE UP (ref 3.5–5.3)
POTASSIUM SERPL-SCNC: 4.3 MMOL/L — SIGNIFICANT CHANGE UP (ref 3.5–5.3)
RBC # BLD: 3.89 M/UL — LOW (ref 4.2–5.8)
RBC # FLD: 13.1 % — SIGNIFICANT CHANGE UP (ref 10.3–14.5)
SODIUM SERPL-SCNC: 131 MMOL/L — LOW (ref 135–145)
SODIUM SERPL-SCNC: 132 MMOL/L — LOW (ref 135–145)
WBC # BLD: 6.84 K/UL — SIGNIFICANT CHANGE UP (ref 3.8–10.5)
WBC # FLD AUTO: 6.84 K/UL — SIGNIFICANT CHANGE UP (ref 3.8–10.5)

## 2020-07-15 PROCEDURE — 99233 SBSQ HOSP IP/OBS HIGH 50: CPT | Mod: GC

## 2020-07-15 PROCEDURE — 93880 EXTRACRANIAL BILAT STUDY: CPT | Mod: 26

## 2020-07-15 PROCEDURE — 76775 US EXAM ABDO BACK WALL LIM: CPT | Mod: 26

## 2020-07-15 RX ORDER — MECLIZINE HCL 12.5 MG
25 TABLET ORAL ONCE
Refills: 0 | Status: COMPLETED | OUTPATIENT
Start: 2020-07-15 | End: 2020-07-15

## 2020-07-15 RX ORDER — SODIUM CHLORIDE 9 MG/ML
1000 INJECTION INTRAMUSCULAR; INTRAVENOUS; SUBCUTANEOUS
Refills: 0 | Status: DISCONTINUED | OUTPATIENT
Start: 2020-07-15 | End: 2020-07-18

## 2020-07-15 RX ORDER — LOSARTAN POTASSIUM 100 MG/1
50 TABLET, FILM COATED ORAL DAILY
Refills: 0 | Status: DISCONTINUED | OUTPATIENT
Start: 2020-07-15 | End: 2020-07-17

## 2020-07-15 RX ADMIN — PANTOPRAZOLE SODIUM 40 MILLIGRAM(S): 20 TABLET, DELAYED RELEASE ORAL at 05:20

## 2020-07-15 RX ADMIN — TAMSULOSIN HYDROCHLORIDE 0.4 MILLIGRAM(S): 0.4 CAPSULE ORAL at 22:05

## 2020-07-15 RX ADMIN — LOSARTAN POTASSIUM 50 MILLIGRAM(S): 100 TABLET, FILM COATED ORAL at 05:20

## 2020-07-15 RX ADMIN — ATORVASTATIN CALCIUM 10 MILLIGRAM(S): 80 TABLET, FILM COATED ORAL at 22:04

## 2020-07-15 RX ADMIN — SODIUM CHLORIDE 50 MILLILITER(S): 9 INJECTION INTRAMUSCULAR; INTRAVENOUS; SUBCUTANEOUS at 14:01

## 2020-07-15 RX ADMIN — HEPARIN SODIUM 5000 UNIT(S): 5000 INJECTION INTRAVENOUS; SUBCUTANEOUS at 17:06

## 2020-07-15 RX ADMIN — HEPARIN SODIUM 5000 UNIT(S): 5000 INJECTION INTRAVENOUS; SUBCUTANEOUS at 05:20

## 2020-07-15 RX ADMIN — Medication 25 MILLIGRAM(S): at 23:07

## 2020-07-15 NOTE — DISCHARGE NOTE PROVIDER - CARE PROVIDERS DIRECT ADDRESSES
[email or web address]

## 2020-07-15 NOTE — PROGRESS NOTE ADULT - ASSESSMENT
Patient is a 86y Cantonese speaking Male with HTN on Metoprolol/ Candesartan, HLD, GERD, BPH, vertigo p/w dizziness, frequent falls, chest pain and nausea. Pt found to be bradycardic with elevated serum creatinine and hyponatremia. Nephrology consulted for Elevated serum creatinine/ Hyponatremia.     1. MARTITA- pre-renal in the setting of nausea with decreased PO intake. Renal function improving with IVF. Will restart IVF as below. Will defer Renal US since MARTITA is resolving. Strict I/Os. Avoid nephrotoxins/ NSAIDs/ RCA. Monitor BMP.  2. Hyponatremia- likely hypovolemic hyponatremia. Pt with rapid increase in Serum Na from 120 to 130 due to NS IVF, for which pt had brief course of D5 @ 40cc/hr x 6hrs. Repeat serum Na stabilized around 131-132. Will start NS @ 50cc/hr x24 hrs. Goal serum Na today 132-136. TSH wnl. Monitor serum Na.   3. CKD -3- pt likely with underlying CKD; last SCr 1.2-1.4 in 2016?baseline. Spot Upr/Cr 0.18 on ARB. Will defer secondary w/u as an outpt. Avoid nephrotoxins  4. HTN 2/2 CKD- BP elevated. Metoprolol held due to bradycardia on admission as per Cards. Pt currently on Losartan 50mg PO qd, can increased to 75mg PO qd. Monitor BP.   5. BPH- c/w flomax Patient is a 86y Cantonese speaking Male with HTN on Metoprolol/ Candesartan, HLD, GERD, BPH, vertigo p/w dizziness, frequent falls, chest pain and nausea. Pt found to be bradycardic with elevated serum creatinine and hyponatremia. Nephrology consulted for Elevated serum creatinine/ Hyponatremia.     1. MARTITA- pre-renal in the setting of nausea with decreased PO intake. Renal function improving with IVF. Will restart IVF as below. Renal US with no hydro. Strict I/Os. Avoid nephrotoxins/ NSAIDs/ RCA. Monitor BMP.  2. Hyponatremia- likely hypovolemic hyponatremia. Pt with rapid increase in Serum Na from 120 to 130 due to NS IVF, for which pt had brief course of D5 @ 40cc/hr x 6hrs. Repeat serum Na stabilized around 131-132. Will start NS @ 50cc/hr x24 hrs. Goal serum Na today 132-136. TSH wnl. Monitor serum Na.   3. CKD -3- pt likely with underlying CKD; last SCr 1.2-1.4 in 2016?baseline. Spot Upr/Cr 0.18 on ARB. Will defer secondary w/u as an outpt. Avoid nephrotoxins  4. HTN 2/2 CKD- BP elevated. Metoprolol held due to bradycardia on admission as per Cards. Pt currently on Losartan 50mg PO qd, can increased to 75mg PO qd. Monitor BP.   5. BPH- c/w flomax

## 2020-07-15 NOTE — DISCHARGE NOTE PROVIDER - NSDCMRMEDTOKEN_GEN_ALL_CORE_FT
candesartan 16 mg oral tablet: 1 tab(s) orally once a day  Lipitor 10 mg oral tablet: 1 tab(s) orally once a day (at bedtime)  metoprolol succinate 50 mg oral tablet, extended release: 1 tab(s) orally once a day  omeprazole 40 mg oral delayed release capsule: 1 cap(s) orally once a day before meals  raNITIdine 150 mg oral capsule: 1 cap(s) orally once a day  tamsulosin 0.4 mg oral capsule: 1 cap(s) orally once a day (at bedtime)  Uloric 40 mg oral tablet: 1 tab(s) orally once a day Aspirin Low Dose 81 mg oral tablet, chewable: 1 tab(s) orally once a day  candesartan 8 mg oral tablet: 1 tab(s) orally once a day   febuxostat 40 mg oral tablet: 1 tab(s) orally once a day  Lipitor 10 mg oral tablet: 1 tab(s) orally once a day (at bedtime)  meclizine 25 mg oral tablet: 1 tab(s) orally prn, As Needed  Metoprolol Succinate ER 25 mg oral tablet, extended release: 1 tab(s) orally once a day   NIFEdipine (Eqv-Procardia XL) 30 mg oral tablet, extended release: 1 tab(s) orally once a day  omeprazole 40 mg oral delayed release capsule: 1 cap(s) orally once a day before meals  raNITIdine 150 mg oral capsule: 1 cap(s) orally once a day  tamsulosin 0.4 mg oral capsule: 1 cap(s) orally once a day (at bedtime)

## 2020-07-15 NOTE — PROGRESS NOTE ADULT - SUBJECTIVE AND OBJECTIVE BOX
[facility] NEPHROLOGY- PROGRESS NOTE    Patient is a 86y Cantonese speaking Male with HTN on Metoprolol/ Candesartan, HLD, GERD, BPH, vertigo p/w dizziness, frequent falls, chest pain and nausea. Pt found to be bradycardic with elevated serum creatinine and hyponatremia. Nephrology consulted for Elevated serum creatinine/ Hyponatremia.     Hospital Medications: Medications reviewed.  REVIEW OF SYSTEMS:  CONSTITUTIONAL: No fevers or chills; denies dizziness  RESPIRATORY: No shortness of breath  CARDIOVASCULAR: No chest pain.  GASTROINTESTINAL: No nausea, vomiting, diarrhea or abdominal pain.   VASCULAR: No bilateral lower extremity edema.     VITALS:  T(F): 97.8 (07-15-20 @ 11:02), Max: 98.3 (20 @ 22:11)  HR: 79 (07-15-20 @ 11:02)  BP: 163/43 (07-15-20 @ 11:02)  RR: 18 (07-15-20 @ 11:02)  SpO2: 98% (07-15-20 @ 11:02)  Wt(kg): --  Height (cm): 154 ( @ 17:59)  Weight (kg): 51 ( @ 17:59)  BMI (kg/m2): 21.5 ( 17:59)  BSA (m2): 1.47 ( 17:59)     @ 07:01  -  07-15 @ 07:00  --------------------------------------------------------  IN: 0 mL / OUT: 550 mL / NET: -550 mL    07-15 @ 07:01  -  07-15 @ 14:10  --------------------------------------------------------  IN: 200 mL / OUT: 0 mL / NET: 200 mL      PHYSICAL EXAM:  Constitutional: NAD  Neurological: Awake and Alert  HEENT: anicteric sclera,   Respiratory: CTA b/l  Cardiovascular: S1, S2, RRR  Gastrointestinal: BS+, soft, NT/ND  Extremities: No peripheral edema    LABS:  07-15  132 <--, 131 <--, 130 <--, 127 <--, 120 <--  132<L>  |  98  |  24<H>  ----------------------------<  154<H>  4.3   |  24  |  1.47<H>    Ca    8.8      15 Jul 2020 13:55  Phos  3.7       Mg     2.0         TPro  6.5  /  Alb  3.2<L>  /  TBili  0.5  /  DBili      /  AST  36  /  ALT  37  /  AlkPhos  42      Creatinine Trend: 1.47 <--, 1.36 <--, 1.54 <--, 1.83 <--, 2.40 <--                        12.0   6.84  )-----------( 180      ( 15 Jul 2020 07:30 )             34.1     Urine Studies:  Urinalysis Basic - ( 2020 22:11 )    Color: Yellow / Appearance: Clear / S.020 / pH:   Gluc:  / Ketone: Negative  / Bili: Negative / Urobili: Negative   Blood:  / Protein: 15 / Nitrite: Negative   Leuk Esterase: Negative / RBC: 2-5 /HPF / WBC 0-2 /HPF   Sq Epi:  / Non Sq Epi: Few /HPF / Bacteria: Trace /HPF      Chloride, Random Urine: <10 mmol/L ( @ 22:11)  Creatinine, Random Urine: 122 mg/dL ( @ 22:11)  Sodium, Random Urine: 12 mmol/L ( @ 22:11)  Osmolality, Random Urine: 372 mos/kg ( @ 22:09)    RADIOLOGY & ADDITIONAL STUDIES: [facility] NEPHROLOGY- PROGRESS NOTE    Patient is a 86y Cantonese speaking Male with HTN on Metoprolol/ Candesartan, HLD, GERD, BPH, vertigo p/w dizziness, frequent falls, chest pain and nausea. Pt found to be bradycardic with elevated serum creatinine and hyponatremia. Nephrology consulted for Elevated serum creatinine/ Hyponatremia.     Hospital Medications: Medications reviewed.  REVIEW OF SYSTEMS:  CONSTITUTIONAL: No fevers or chills; denies dizziness  RESPIRATORY: No shortness of breath  CARDIOVASCULAR: No chest pain.  GASTROINTESTINAL: No nausea, vomiting, diarrhea or abdominal pain.   VASCULAR: No bilateral lower extremity edema.     VITALS:  T(F): 97.8 (07-15-20 @ 11:02), Max: 98.3 (20 @ 22:11)  HR: 79 (07-15-20 @ 11:02)  BP: 163/43 (07-15-20 @ 11:02)  RR: 18 (07-15-20 @ 11:02)  SpO2: 98% (07-15-20 @ 11:02)  Wt(kg): --  Height (cm): 154 ( @ 17:59)  Weight (kg): 51 ( @ 17:59)  BMI (kg/m2): 21.5 ( 17:59)  BSA (m2): 1.47 ( 17:59)     @ 07:01  -  07-15 @ 07:00  --------------------------------------------------------  IN: 0 mL / OUT: 550 mL / NET: -550 mL    07-15 @ 07:01  -  07-15 @ 14:10  --------------------------------------------------------  IN: 200 mL / OUT: 0 mL / NET: 200 mL      PHYSICAL EXAM:  Constitutional: NAD  Neurological: Awake and Alert  HEENT: anicteric sclera,   Respiratory: CTA b/l  Cardiovascular: S1, S2, RRR  Gastrointestinal: BS+, soft, NT/ND  Extremities: No peripheral edema    LABS:  07-15  132 <--, 131 <--, 130 <--, 127 <--, 120 <--  132<L>  |  98  |  24<H>  ----------------------------<  154<H>  4.3   |  24  |  1.47<H>    Ca    8.8      15 Jul 2020 13:55  Phos  3.7       Mg     2.0         TPro  6.5  /  Alb  3.2<L>  /  TBili  0.5  /  DBili      /  AST  36  /  ALT  37  /  AlkPhos  42      Creatinine Trend: 1.47 <--, 1.36 <--, 1.54 <--, 1.83 <--, 2.40 <--                        12.0   6.84  )-----------( 180      ( 15 Jul 2020 07:30 )             34.1     Urine Studies:  Urinalysis Basic - ( 2020 22:11 )    Color: Yellow / Appearance: Clear / S.020 / pH:   Gluc:  / Ketone: Negative  / Bili: Negative / Urobili: Negative   Blood:  / Protein: 15 / Nitrite: Negative   Leuk Esterase: Negative / RBC: 2-5 /HPF / WBC 0-2 /HPF   Sq Epi:  / Non Sq Epi: Few /HPF / Bacteria: Trace /HPF      Chloride, Random Urine: <10 mmol/L ( @ 22:11)  Creatinine, Random Urine: 122 mg/dL ( @ 22:11)  Sodium, Random Urine: 12 mmol/L ( @ 22:11)  Osmolality, Random Urine: 372 mos/kg ( @ 22:09)    RADIOLOGY & ADDITIONAL STUDIES:    < from: US Renal (07.15.20 @ 11:36) >  EXAM:  US KIDNEY(S)                            PROCEDURE DATE:  07/15/2020          INTERPRETATION:  CLINICAL INFORMATION: bph, evaluate for obstruction    COMPARISON: None available.    TECHNIQUE: Sonography of the kidneys and bladder.     FINDINGS:    Study is technically limited by respiratory motion.    Right kidney:  8.8 cm. No hydronephrosis.    Left kidney:  9.7 cm. No hydronephrosis.  1.0 cm lower pole cyst.    Urinary bladder: Underdistended.    IMPRESSION:     No hydronephrosis.    There is a 1 cm cyst in the lower pole of the left kidney.    < end of copied text >

## 2020-07-15 NOTE — PROGRESS NOTE ADULT - SUBJECTIVE AND OBJECTIVE BOX
PGY 1 Note discussed with supervising resident and primary attending    Patient is a 86y old  Male who presents with a chief complaint of Dizziness with chest discomfort (2020 13:36)      INTERVAL HPI/OVERNIGHT EVENTS: no events noted overnight.    MEDICATIONS  (STANDING):  atorvastatin 10 milliGRAM(s) Oral at bedtime  atropine Injectable 0.5 milliGRAM(s) IV Push once  heparin   Injectable 5000 Unit(s) SubCutaneous every 12 hours  losartan 50 milliGRAM(s) Oral daily  pantoprazole    Tablet 40 milliGRAM(s) Oral before breakfast  sodium chloride 0.9%. 1000 milliLiter(s) (50 mL/Hr) IV Continuous <Continuous>  tamsulosin 0.4 milliGRAM(s) Oral at bedtime    MEDICATIONS  (PRN):      __________________________________________________  REVIEW OF SYSTEMS:  CONSTITUTIONAL: No fever   EYES: no acute visual disturbances  NECK: No pain or stiffness  RESPIRATORY: No cough; No shortness of breath  CARDIOVASCULAR: No chest pain, no palpitations  GASTROINTESTINAL: No pain. No nausea or vomiting; No diarrhea   NEUROLOGICAL: No headache or numbness, no tremors  MUSCULOSKELETAL: No joint pain, no muscle pain  GENITOURINARY: no dysuria, no frequency, no hesitancy  PSYCHIATRY: no depression , no anxiety  ALL OTHER ROS negative        Vital Signs Last 24 Hrs  T(C): 36.6 (15 Jul 2020 11:02), Max: 36.8 (2020 22:11)  T(F): 97.8 (15 Jul 2020 11:02), Max: 98.3 (2020 22:11)  HR: 79 (15 Jul 2020 11:02) (57 - 79)  BP: 163/43 (15 Jul 2020 11:02) (131/50 - 188/63)  BP(mean): --  RR: 18 (15 Jul 2020 11:02) (18 - 20)  SpO2: 98% (15 Jul 2020 11:02) (97% - 98%)    ________________________________________________  PHYSICAL EXAM:  GENERAL: NAD  HEENT: Normocephalic;  conjunctivae and sclerae clear; moist mucous membranes;   NECK : supple  CHEST/LUNG: Clear to auscultation bilaterally with good air entry   HEART: S1 S2  regular; no murmurs, gallops or rubs  ABDOMEN: Soft, Nontender, Nondistended; Bowel sounds present  EXTREMITIES: no cyanosis; no edema; no calf tenderness  SKIN: warm and dry; no rash  NERVOUS SYSTEM:  Awake and alert; Oriented  to place, person and time ; no new deficits    _________________________________________________  LABS:                        12.0   6.84  )-----------( 180      ( 15 Jul 2020 07:30 )             34.1     07-15    131<L>  |  98  |  22<H>  ----------------------------<  81  4.1   |  25  |  1.36<H>    Ca    8.8      15 Jul 2020 07:30  Phos  3.7     07-14  Mg     2.0     07-14    TPro  6.5  /  Alb  3.2<L>  /  TBili  0.5  /  DBili  x   /  AST  36  /  ALT  37  /  AlkPhos  42  07-14      Urinalysis Basic - ( 2020 22:11 )    Color: Yellow / Appearance: Clear / S.020 / pH: x  Gluc: x / Ketone: Negative  / Bili: Negative / Urobili: Negative   Blood: x / Protein: 15 / Nitrite: Negative   Leuk Esterase: Negative / RBC: 2-5 /HPF / WBC 0-2 /HPF   Sq Epi: x / Non Sq Epi: Few /HPF / Bacteria: Trace /HPF      CAPILLARY BLOOD GLUCOSE            RADIOLOGY & ADDITIONAL TESTS:    Imaging Personally Reviewed:  YES    Consultant(s) Notes Reviewed:   YES    Care Discussed with Consultants : YES     Plan of care was discussed with patient and /or primary care giver; all questions and concerns were addressed and care was aligned with patient's wishes. PGY 1 Note discussed with supervising resident and primary attending    Patient is a 86y old  Male who presents with a chief complaint of Dizziness with chest discomfort (2020 13:36)      INTERVAL HPI/OVERNIGHT EVENTS: Pt with 1 episode of SBP 170Rn notified call team of SBPs 170s. Pt given Losartan 25mg stat. Pt comfortable during interview and examination this AM. Denies chest pain. SBP 130s-160s this AM.      MEDICATIONS  (STANDING):  atorvastatin 10 milliGRAM(s) Oral at bedtime  atropine Injectable 0.5 milliGRAM(s) IV Push once  heparin   Injectable 5000 Unit(s) SubCutaneous every 12 hours  losartan 50 milliGRAM(s) Oral daily  pantoprazole    Tablet 40 milliGRAM(s) Oral before breakfast  sodium chloride 0.9%. 1000 milliLiter(s) (50 mL/Hr) IV Continuous <Continuous>  tamsulosin 0.4 milliGRAM(s) Oral at bedtime    MEDICATIONS  (PRN):      __________________________________________________  REVIEW OF SYSTEMS:  CONSTITUTIONAL: No fever   EYES: no acute visual disturbances  NECK: No pain or stiffness  RESPIRATORY: No cough; No shortness of breath  CARDIOVASCULAR: No chest pain, no palpitations  GASTROINTESTINAL: No pain. No nausea or vomiting; No diarrhea   NEUROLOGICAL: No headache or numbness, no tremors  MUSCULOSKELETAL: No joint pain, no muscle pain  GENITOURINARY: no dysuria, no frequency, no hesitancy  PSYCHIATRY: no depression , no anxiety  ALL OTHER ROS negative        Vital Signs Last 24 Hrs  T(C): 36.6 (15 Jul 2020 11:02), Max: 36.8 (2020 22:11)  T(F): 97.8 (15 Jul 2020 11:02), Max: 98.3 (2020 22:11)  HR: 79 (15 Jul 2020 11:02) (57 - 79)  BP: 163/43 (15 Jul 2020 11:02) (131/50 - 188/63)  BP(mean): --  RR: 18 (15 Jul 2020 11:02) (18 - 20)  SpO2: 98% (15 Jul 2020 11:02) (97% - 98%)    ________________________________________________  PHYSICAL EXAM:  GENERAL: NAD  HEENT: Normocephalic;  conjunctivae and sclerae clear; moist mucous membranes;   NECK : supple  CHEST/LUNG: Clear to auscultation bilaterally with good air entry   HEART: S1 S2  regular; no murmurs, gallops or rubs  ABDOMEN: Soft, Nontender, Nondistended; Bowel sounds present  EXTREMITIES: no cyanosis; no edema; no calf tenderness  SKIN: warm and dry; no rash  NERVOUS SYSTEM:  Awake and alert; Oriented  to place, person and time ; no new deficits    _________________________________________________  LABS:                        12.0   6.84  )-----------( 180      ( 15 Jul 2020 07:30 )             34.1     07-15    131<L>  |  98  |  22<H>  ----------------------------<  81  4.1   |  25  |  1.36<H>    Ca    8.8      15 Jul 2020 07:30  Phos  3.7     07-14  Mg     2.0     07-14    TPro  6.5  /  Alb  3.2<L>  /  TBili  0.5  /  DBili  x   /  AST  36  /  ALT  37  /  AlkPhos  42  07-14      Urinalysis Basic - ( 2020 22:11 )    Color: Yellow / Appearance: Clear / S.020 / pH: x  Gluc: x / Ketone: Negative  / Bili: Negative / Urobili: Negative   Blood: x / Protein: 15 / Nitrite: Negative   Leuk Esterase: Negative / RBC: 2-5 /HPF / WBC 0-2 /HPF   Sq Epi: x / Non Sq Epi: Few /HPF / Bacteria: Trace /HPF      CAPILLARY BLOOD GLUCOSE            RADIOLOGY & ADDITIONAL TESTS:    < from: US Duplex Carotid Arteries Complete, Bilateral (07.15.20 @ 12:07) >  Impression: Moderate calcific atheromatosis right carotid bifurcation. No flow-limiting stenosis bilateral extracranial carotids    < end of copied text >    < from: Transthoracic Echocardiogram (20 @ 07:30) >  CONCLUSIONS:  1. Normal mitral valve. Mild mitral regurgitation.  2. Normal trileaflet aortic valve. Mild aortic  regurgitation.  3. Aortic Root: 2.9 cm.    4. Normal left atrium.  5. Normal left ventricular internal dimensions and wall  thicknesses.  6. Normal Left Ventricular Systolic Function,  (EF = 55 to  60%)  7. Grade I diastolic dysfunction (Impaired relaxation).  8. Normal right atrium.  9. Normal right ventricular size and systolic function  (TAPSE  1.8cm,tissue doppler .11m/s).  10. RA Pressure is8 mm Hg.  11. RV systolic pressure is mildly increased at  39 mm Hg.  12. There is moderate-severe tricuspid regurgitation.  13. Normal pulmonic valve.  14. Agitated saline injection revealed bubbles in the left  heart, consistent with patent foramenovale or atrial  septal defect.  15. Normal pericardium with no pericardial effusion.    < end of copied text >      Imaging Personally Reviewed:  YES    Consultant(s) Notes Reviewed:   YES    Care Discussed with Consultants : YES     Plan of care was discussed with patient and /or primary care giver; all questions and concerns were addressed and care was aligned with patient's wishes. PGY 1 Note discussed with supervising resident and primary attending    Patient is a 86y old  Male who presents with a chief complaint of Dizziness with chest discomfort (2020 13:36)      INTERVAL HPI/OVERNIGHT EVENTS: Pt with 1 episode of  given  Losartan 25mg stat. Pt comfortable during interview and examination this AM. Denies chest pain. SBP 130s-160s this AM.      MEDICATIONS  (STANDING):  atorvastatin 10 milliGRAM(s) Oral at bedtime  atropine Injectable 0.5 milliGRAM(s) IV Push once  heparin   Injectable 5000 Unit(s) SubCutaneous every 12 hours  losartan 50 milliGRAM(s) Oral daily  pantoprazole    Tablet 40 milliGRAM(s) Oral before breakfast  sodium chloride 0.9%. 1000 milliLiter(s) (50 mL/Hr) IV Continuous <Continuous>  tamsulosin 0.4 milliGRAM(s) Oral at bedtime    MEDICATIONS  (PRN):      __________________________________________________  REVIEW OF SYSTEMS:  CONSTITUTIONAL: No fever   EYES: no acute visual disturbances  NECK: No pain or stiffness  RESPIRATORY: No cough; No shortness of breath  CARDIOVASCULAR: No chest pain, no palpitations  GASTROINTESTINAL: No pain. No nausea or vomiting; No diarrhea   NEUROLOGICAL: No headache or numbness, no tremors  MUSCULOSKELETAL: No joint pain, no muscle pain  GENITOURINARY: no dysuria, no frequency, no hesitancy  PSYCHIATRY: no depression , no anxiety  ALL OTHER ROS negative        Vital Signs Last 24 Hrs  T(C): 36.6 (15 Jul 2020 11:02), Max: 36.8 (2020 22:11)  T(F): 97.8 (15 Jul 2020 11:02), Max: 98.3 (2020 22:11)  HR: 79 (15 Jul 2020 11:02) (57 - 79)  BP: 163/43 (15 Jul 2020 11:02) (131/50 - 188/63)  BP(mean): --  RR: 18 (15 Jul 2020 11:02) (18 - 20)  SpO2: 98% (15 Jul 2020 11:02) (97% - 98%)    ________________________________________________  PHYSICAL EXAM:  GENERAL: NAD  HEENT: Normocephalic;  conjunctivae and sclerae clear; moist mucous membranes;   NECK : supple  CHEST/LUNG: Clear to auscultation bilaterally with good air entry   HEART: S1 S2  regular; no murmurs, gallops or rubs  ABDOMEN: Soft, Nontender, Nondistended; Bowel sounds present  EXTREMITIES: no cyanosis; no edema; no calf tenderness  SKIN: warm and dry; no rash  NERVOUS SYSTEM:  Awake and alert; Oriented  to place, person and time ; no new deficits    _________________________________________________  LABS:                        12.0   6.84  )-----------( 180      ( 15 Jul 2020 07:30 )             34.1     07-15    131<L>  |  98  |  22<H>  ----------------------------<  81  4.1   |  25  |  1.36<H>    Ca    8.8      15 Jul 2020 07:30  Phos  3.7     07-14  Mg     2.0     07-14    TPro  6.5  /  Alb  3.2<L>  /  TBili  0.5  /  DBili  x   /  AST  36  /  ALT  37  /  AlkPhos  42  07-14      Urinalysis Basic - ( 2020 22:11 )    Color: Yellow / Appearance: Clear / S.020 / pH: x  Gluc: x / Ketone: Negative  / Bili: Negative / Urobili: Negative   Blood: x / Protein: 15 / Nitrite: Negative   Leuk Esterase: Negative / RBC: 2-5 /HPF / WBC 0-2 /HPF   Sq Epi: x / Non Sq Epi: Few /HPF / Bacteria: Trace /HPF      CAPILLARY BLOOD GLUCOSE            RADIOLOGY & ADDITIONAL TESTS:    < from: US Duplex Carotid Arteries Complete, Bilateral (07.15.20 @ 12:07) >  Impression: Moderate calcific atheromatosis right carotid bifurcation. No flow-limiting stenosis bilateral extracranial carotids    < end of copied text >    < from: Transthoracic Echocardiogram (20 @ 07:30) >  CONCLUSIONS:  1. Normal mitral valve. Mild mitral regurgitation.  2. Normal trileaflet aortic valve. Mild aortic  regurgitation.  3. Aortic Root: 2.9 cm.    4. Normal left atrium.  5. Normal left ventricular internal dimensions and wall  thicknesses.  6. Normal Left Ventricular Systolic Function,  (EF = 55 to  60%)  7. Grade I diastolic dysfunction (Impaired relaxation).  8. Normal right atrium.  9. Normal right ventricular size and systolic function  (TAPSE  1.8cm,tissue doppler .11m/s).  10. RA Pressure is8 mm Hg.  11. RV systolic pressure is mildly increased at  39 mm Hg.  12. There is moderate-severe tricuspid regurgitation.  13. Normal pulmonic valve.  14. Agitated saline injection revealed bubbles in the left  heart, consistent with patent foramenovale or atrial  septal defect.  15. Normal pericardium with no pericardial effusion.    < end of copied text >      Imaging Personally Reviewed:  YES    Consultant(s) Notes Reviewed:   YES    Care Discussed with Consultants : YES     Plan of care was discussed with patient and /or primary care giver; all questions and concerns were addressed and care was aligned with patient's wishes.

## 2020-07-15 NOTE — PROGRESS NOTE ADULT - PROBLEM SELECTOR PLAN 4
Cr 2.4 on admission, improved with fluids   monitor BMP  hold losartan for now   avoid any nephrotoxic meds and iv contrast  renal US telemetry monitoring   probably vagal response  Hold beta blocker for now   - ECHO  EF 55-60%  - HR 60s-70s  Cardiology consulted Dr. Zhong

## 2020-07-15 NOTE — PROGRESS NOTE ADULT - PROBLEM SELECTOR PLAN 1
telemetry monitoring   vagal response vs beta blocker overdose   now improving HR above 50   Hold beta blocker for now   f/u ECHO   Cardiology consulted Dr. Zhong Na LEVEL 120 likely dehydration (hx of no salt intake as per daughter)   Na improved to 127 -> 130 ->131 ->132 with hydration   on candesartan at home  - regular diet  - monitor bmp  - IVF NSS 50cc/hr x 24 hrs

## 2020-07-15 NOTE — DISCHARGE NOTE PROVIDER - NSDCCPCAREPLAN_GEN_ALL_CORE_FT
PRINCIPAL DISCHARGE DIAGNOSIS  Diagnosis: Hyponatremia syndrome  Assessment and Plan of Treatment: You came in with low sodium level at 120 (Normal range 135-145). You were given IV fluids.      SECONDARY DISCHARGE DIAGNOSES  Diagnosis: Dizziness  Assessment and Plan of Treatment:     Diagnosis: MARTITA (acute kidney injury)  Assessment and Plan of Treatment: You came in with decreased kidney function from dehydration. Your kidney function gradually improved with hydration. PRINCIPAL DISCHARGE DIAGNOSIS  Diagnosis: Hyponatremia syndrome  Assessment and Plan of Treatment: You presented with low sodium concentration which may explain your episodes of dizziness. After correction of sodium, your vertigo improved. It is important that you continue with a regular and consistent diet. Your sodium concentration may also be decreased due to poor oral intake.  You have a history of vertigo and you were started with meclizine as needed. Please continue with meclizine as needed. Please obtain a follow up appointment with your PCP in 1-2 weeks to inform them of your recent hospitalization.      SECONDARY DISCHARGE DIAGNOSES  Diagnosis: Vertigo  Assessment and Plan of Treatment: You have a history of vertigo with outpatient follow up. It may have been worsened due to your hyponatremia. Please continue to maintain close follow up with your outpatient provider and Dr. Ganesh Mayer in [place]    Diagnosis: BPH (benign prostatic hyperplasia)  Assessment and Plan of Treatment: Continue with Flomax as indicated in medication reconciliation. Follow up with your primary care physician within one week of dicharge to inform of your recent hospitalization.      Diagnosis: HTN (hypertension)  Assessment and Plan of Treatment: You have a history of HTN on metoprolol succinate 50mg as well as candesartan 16mg.   Due to bradycardia, metoprolol succinate dose was reduced to 25mg once a day.  Due to kidney injury, we recommend that you decrease your candesartan dose to 8mg and we have added a third agent, nifedipine.   Follow up with your primary care physician within one week of discharge to inform of your recent hospitalization    Diagnosis: Acute on chronic kidney failure  Assessment and Plan of Treatment: You have a history of elevated creatine levels indicating chronic kidney disease. During your admission your creatinine was acutely elevated. You were hydrated with IV fluids and your kidney function improved. Please follow up with your outpatient provider to repeat your BMP in 1 week. Ensure adequate hydration. Avoid medications that may hurt your kidneys such as NSAIDS. PRINCIPAL DISCHARGE DIAGNOSIS  Diagnosis: Hyponatremia syndrome  Assessment and Plan of Treatment: You presented with low sodium concentration which may explain your episodes of dizziness. After correction of sodium, your vertigo improved. It is important that you continue with a regular and consistent diet. Your sodium concentration may also be decreased due to poor oral intake.  You have a history of vertigo and you were started with meclizine as needed. Please continue with meclizine as needed. Please obtain a follow up appointment with your PCP in 1-2 weeks to inform them of your recent hospitalization.      SECONDARY DISCHARGE DIAGNOSES  Diagnosis: Vertigo  Assessment and Plan of Treatment: You have a history of vertigo with outpatient follow up. It may have been worsened due to your hyponatremia. Please continue to maintain close follow up with your outpatient provider and Dr. Ganesh Mayer in [place]    Diagnosis: BPH (benign prostatic hyperplasia)  Assessment and Plan of Treatment: Continue with Flomax as indicated in medication reconciliation. Follow up with your primary care physician within one week of dicharge to inform of your recent hospitalization.      Diagnosis: HTN (hypertension)  Assessment and Plan of Treatment: You have a history of HTN on metoprolol succinate 50 mg as well as candesartan 16mg.   Due to bradycardia (low Heart rate), metoprolol succinate dose was reduced to 25mg once a day.  Due to kidney injury, we recommend that you decrease your candesartan dose to 8 mg and we have added a third agent, nifedipine.   Follow up with your primary care physician within one week of discharge to inform of your recent hospitalization  If Blood presure elevated, may increase dose of Nifedipine to 60mg daily as outpatient with PCP.    Diagnosis: Acute on chronic kidney failure  Assessment and Plan of Treatment: You have a history of elevated creatine levels indicating chronic kidney disease. During your admission your creatinine was acutely elevated. You were hydrated with IV fluids and your kidney function improved. Please follow up with your outpatient provider to repeat your BMP in 1 week. Ensure adequate hydration. Avoid medications that may hurt your kidneys such as NSAIDS.    Diagnosis: GERD (gastroesophageal reflux disease)  Assessment and Plan of Treatment: You have history of GERD . Continue with Ranitidine    Diagnosis: Gout  Assessment and Plan of Treatment: You have history of Gout. Please continue taking Febuxostat PRINCIPAL DISCHARGE DIAGNOSIS  Diagnosis: Hyponatremia syndrome  Assessment and Plan of Treatment: You presented with low sodium concentration which may explain your episodes of dizziness. After correction of sodium, your vertigo improved. It is important that you continue with a regular and consistent diet. Your sodium concentration may also be decreased due to poor oral intake.  You have a history of vertigo and you were started with meclizine as needed. Please continue with meclizine as needed. Please obtain a follow up appointment with your PCP in 1-2 weeks to inform them of your recent hospitalization.  Please keep yourself hydrated at least 6 to 8 glasses of water daily. Dehydration can result in dizziness and vertiginous symptoms.      SECONDARY DISCHARGE DIAGNOSES  Diagnosis: Vertigo  Assessment and Plan of Treatment: You have a history of vertigo with outpatient follow up. It may have been worsened due to your hyponatremia. Please continue to maintain close follow up with your outpatient provider and Dr. Ganesh Mayer in [place]    Diagnosis: BPH (benign prostatic hyperplasia)  Assessment and Plan of Treatment: Continue with Flomax as indicated in medication reconciliation. Follow up with your primary care physician within one week of dicharge to inform of your recent hospitalization.      Diagnosis: HTN (hypertension)  Assessment and Plan of Treatment: You have a history of HTN on metoprolol succinate 50 mg as well as candesartan 16mg.   Due to bradycardia (low Heart rate), metoprolol succinate dose was reduced to 25mg once a day.  Due to kidney injury, we recommend that you decrease your candesartan dose to 8 mg and we have added a third agent, nifedipine.   We are placing Nifedipine 30 mg daily for now. If BP elevated more than 140/90 mm Hg we recommend to increase Nifedipine to 60 mg daily.   Follow up with your primary care physician within one week of discharge to inform of your recent hospitalization  target BP for you less than 140/80 mm Hg adjusted for your age.   If Blood presure elevated, may increase dose of Nifedipine to 60mg daily as outpatient with PCP.    Diagnosis: Acute on chronic kidney failure  Assessment and Plan of Treatment: You have a history of elevated creatine levels indicating chronic kidney disease. During your admission your creatinine was acutely elevated. You were hydrated with IV fluids and your kidney function improved. Please follow up with your outpatient provider to repeat your BMP in 1 week. Ensure adequate hydration. Avoid medications that may hurt your kidneys such as NSAIDS.    Diagnosis: GERD (gastroesophageal reflux disease)  Assessment and Plan of Treatment: You have history of GERD . Continue with Ranitidine    Diagnosis: Gout  Assessment and Plan of Treatment: You have history of Gout. Please continue taking Febuxostat

## 2020-07-15 NOTE — PROGRESS NOTE ADULT - PROBLEM SELECTOR PLAN 3
Na LEVEL 120 likely dehydration (hx of no salt intake as per daughter)   Na improved to 127 with hydration   on candesartan at home  regular diet  monitor bmp  hold IV fluids for now and repeat sodium p/w dizziness for 2 week  hx of vertigo EKG shows mercy sinus 40s  telemetry monitoring   stress test In 2016 shows EF >70%  - Echo 55-60% EF  - Carotid doppler shows Moderate calcific atheromatosis right carotid bifurcation  Dr Zhong cardiology on board

## 2020-07-15 NOTE — DISCHARGE NOTE PROVIDER - HOSPITAL COURSE
83 y/o male, from home,  Cantonese speaking with PMH of HTN on metoprol, BPH, GERD, chronic vertigo who presented with persistent dizziness for the past 2 weeks with intermittent chest discomfort. In the ED pt found to be bradycardic. EKG showed  sinus bradycardia in the 40s. Pt found to be hyponatremic with sodium level 120. Pt admitted to telemetry with symptomatic sinus bradycardia, hyponatremia and MARTITA. Troponin negative         Hyponatremia    Pt with sodium 120 in the ED likely from dehydration from low oral intake. Pt treated with IVF NSS for sodium correction.         Acute Kidney Injury    Pt with Serum creatinine 2.4 on admission secondary to dehydration. Pt home medications candesartan was discontinue. Pt creatinine improved with hydration. Renal US showed no hydronephrosis. With improving kidney function, patient started on Losartan for hypertensive control.        Dizziness    Pt w/ h/o vertigo p/w dizziness for 2wks. Echo shows 55-60% EF. Carotid Doppler shows moderate calcific atheromatosis at R carotid bifurcation.         Bradycardia    Pt with HR 40s on admission. Pt home med metoprolol held. HR increased to 60-70s with hydration. Bradycardia secondary to vagal response. Cardiology consulted, Dr. Zhong.        BPH    Pt with history of tamsulosin 0.4mg qd. Pt continued on home med.        HTN    Pt w/ h/o of HTN on candesartan 16mg qd, metoprolol 50mg. Metoprolol        Gout    h/o of Gout on uloric 40mg at home.         GERD     comtinue protonix. 83 y/o male, from home,  Cantonese speaking with PMH of HTN on metoprol, BPH, GERD, chronic vertigo who presented with persistent dizziness for the past 2 weeks with intermittent chest discomfort. In the ED pt found to be bradycardic. EKG showed  sinus bradycardia in the 40s. Pt found to be hyponatremic with sodium level 120. Pt admitted to telemetry with symptomatic sinus bradycardia, hyponatremia and MARTITA. Troponin negative         Hyponatremia    Pt with sodium 120 in the ED likely from dehydration from low oral intake. Pt treated with IVF NSS for sodium correction. Nephrology was consulted. Patient's sodium levels originally overcorrected in the first 24 hours in which d5 iv fluids was added. Fluids then stopped as patient's oral intake started increasing and sodium corrected appropriately.         Acute Kidney Injury    Pt with Serum creatinine 2.4 on admission secondary to dehydration. Pt home medications candesartan was discontinue. Pt creatinine improved with hydration. Renal US showed no hydronephrosis. With improving kidney function, patient started on Losartan for hypertensive control.         Dizziness    Pt w/ h/o vertigo p/w dizziness for 2wks. Echo shows 55-60% EF. Carotid Doppler shows moderate calcific atheromatosis at R carotid bifurcation. Patient's dizziness improved with correction of hyponatremia. patient continued on meclizine for episodes of dizziness.        Bradycardia    Pt with HR 40s on admission. Pt home med metoprolol held. HR increased to 60-70s with hydration. Bradycardia secondary to vagal response. Cardiology consulted, Dr. Zhong. Patient was then restarted on metoprolol at half the dose of home medication to prevent bradycardia.        BPH    Pt with history of tamsulosin 0.4mg qd. Pt continued on home med.        HTN    Pt w/ h/o of HTN on candesartan 16mg qd, metoprolol 50mg. Metoprolol dosing was decreased from 50mg XL qdaily to 25mg metoprolol succinate. Losartan started in place of candesartan, due to MARTITA, losartan dose decreased and added third agent, nifedipine. Patient to continue with new regimen as patient achieved adequate BP control.         Gout    h/o of Gout on uloric 40mg at home.         GERD     comtinue protonix.

## 2020-07-15 NOTE — PROGRESS NOTE ADULT - PROBLEM SELECTOR PLAN 2
p/w dizziness for 2 week  hx of vertigo EKG shows mercy sinus 40s  telemetry monitoring   stress test In 2016 shows EF >70%  f/u echo with bubble study and carotid dopplers  Dr Zhong cardiology on board - Cr 2.4 on admission, improved with fluids.   - Cr 1.36 in AM. 1.47 in PM   - renal US shows no hydronephrosis  - started Losartan 50mg QD   -avoid any nephrotoxic meds and iv contrast  -monitor BMP

## 2020-07-15 NOTE — PROGRESS NOTE ADULT - ATTENDING COMMENTS
Patient seen and examined earlier this afternoon; Agree with NP A/P above with editing as needed. My independent assessment, findings on exam, diagnosis and plan of care as listed below. Discussed with NEHEMIAS Enrique.    83 y/o male, from home,  Cantonese speaking primarily but can speak and understand  English,  with PMH of HTN on Metoprolol, BPH, GERD, chronic vertigo who presented with persistent dizziness for the past 2 weeks with intermittent chest discomfort. In the ED pt found to be  bradycardic.  EKG showed  sinus bradycardia in the 40s. Sodium level 120.    Pt admitted to telemetry with symptomatic sinus bradycardia, hyponatremia and MARTITA     Patient doing much better today ; No more dizziness today. No fever, chills, SOB, palpitations, chest pain, nausea, vomiting, diarrhea,. No acute distress noted. As per patient he was on Losartan/ Olmesartan in the past with no untoward issues, but was switched by his Insurance and pharmacy benefits to candesartan     Vital Signs Last 24 Hrs  T(C): 37.1 (15 Jul 2020 16:22), Max: 37.1 (15 Jul 2020 16:22)  T(F): 98.7 (15 Jul 2020 16:22), Max: 98.7 (15 Jul 2020 16:22)  HR: 73 (15 Jul 2020 16:22) (57 - 79)  BP: 155/54 (15 Jul 2020 16:22) (131/50 - 175/89)  RR: 18 (15 Jul 2020 16:22) (18 - 20)  SpO2: 96% (15 Jul 2020 16:22) (96% - 98%)    P/E:  elderly male, appears more hydrated, not in acute distress  Neuro: AAO x3; No focal deficits  CVS: S1S2 present, regular  Resp: BLAE+, No wheeze or Rhonchi  GI: Soft, BS+, NT  Extr: No edema or calf tenderness    Labs:                        12.0   6.84  )-----------( 180      ( 15 Jul 2020 07:30 )             34.1   07-15    132<L>  |  98  |  24<H>  ----------------------------<  154<H>  4.3   |  24  |  1.47<H>    Ca    8.8      15 Jul 2020 13:55  Phos  3.7     07-14  Mg     2.0     07-14    TPro  6.5  /  Alb  3.2<L>  /  TBili  0.5  /  DBili  x   /  AST  36  /  ALT  37  /  AlkPhos  42  07-14    Transthoracic Echocardiogram (07.14.20 @ 07:30)    CONCLUSIONS:  1. Normal mitral valve. Mild mitral regurgitation.  2. Normal trileaflet aortic valve. Mild aortic regurgitation.  3. Aortic Root: 2.9 cm.  4. Normal left atrium.  5. Normal left ventricular internal dimensions and wall thicknesses.  6. Normal Left Ventricular Systolic Function,  (EF = 55 to 60%)  7. Grade I diastolic dysfunction (Impaired relaxation).  8. Normal right atrium.  9. Normal right ventricular size and systolic function (TAPSE  1.8cm,tissue doppler .11m/s).  10. RA Pressure is8 mm Hg.  11. RV systolic pressure is mildly increased at  39 mm Hg.  12. There is moderate-severe tricuspid regurgitation.  13. Normal pulmonic valve.  14. Agitated saline injection revealed bubbles in the left heart, consistent with patent foramen ovale or atrial septal defect.  15. Normal pericardium with no pericardial effusion.    D/D:  Dizziness likely due to volume depletion due to dehydration  MARTITA due to Prerenal azotemia  Hypovolemic hyponatremia resolving well.  Suspected Symptomatic Sinus Bradycardia  Uncontrolled HTN likely due to withholding meds (Candesartan)  Hx BPH and Gout    Plan:  Continue Tele; No significant Bradycardia on Tele. Hold beta blocker;   Cardio follow up; Echo WNL  Resume low dose ARB; Renal function much improved;   Will substitute with Losartan 25 mg daily and titrate up to get BP control.   Na stable; d/w Renal follow up appreciated; will resume Normal saline at 50cc/hr x 24 hrs  Rest as per PGy1 above  DVT ppx    Discussed with patient findings and plan of care  Discussed with PGY1 Dr. Ordaz and PGY2 Dr. Cantrell and RN

## 2020-07-15 NOTE — DISCHARGE NOTE PROVIDER - NSDCHC_MEDRECSTATUS_GEN_ALL_CORE
Admission Reconciliation is Completed  Discharge Reconciliation is Not Complete Admission Reconciliation is Not Complete  Discharge Reconciliation is Completed Admission Reconciliation is Not Complete  Discharge Reconciliation is Not Complete

## 2020-07-15 NOTE — DISCHARGE NOTE PROVIDER - CARE PROVIDER_API CALL
Gelacio Hull  INTERNAL MEDICINE  [address] [place] [address]  Phone: [phone]  Fax: [phone]  Follow Up Time:     Ganesh Mayer)  Neurotology; Otolaryngology  [address] [place] [address]  Phone: [phone]  Fax: [phone]  Follow Up Time:

## 2020-07-15 NOTE — PROGRESS NOTE ADULT - ASSESSMENT
83 y/o male, from home,  Cantonese speaking with PMH of HTN on metoprol, BPH, GERD, chronic vertigo who presented with persistent dizziness for the past 2 weeks with intermittent chest discomfort. In the ED pt found to be  bradycardic.  EKG showed  sinus bradycardia in the 40s. Sodium level 120  Pt admitted to telemetry with symptomatic sinus bradycardia, hyponatremia and MARTITA   Troponin negative   Pt seen at bedside, alert, awake, denies chest pain, SOB, palpitation

## 2020-07-15 NOTE — PROGRESS NOTE ADULT - ATTENDING COMMENTS
[facility] NEPHROLOGY  Mark Anthony Huffman M.D.  Calixto Jeffery D.O.  Bonnie Brown M.D.  Nuria Hollis, MSN, ANP-C  [phone]    [address] [place] [address]

## 2020-07-15 NOTE — CHART NOTE - NSCHARTNOTEFT_GEN_A_CORE
Call team notified 10:30pm that patient complaining of dizziness. Patient seen and examined at beside, [facility]. Patient known hx of vertigo, states that current dizziness feels similar, but is also present when he closes his eyes which is new. Will trial meclizine x1 and continue to monitor sx.

## 2020-07-16 LAB
ALBUMIN SERPL ELPH-MCNC: 3.3 G/DL — LOW (ref 3.5–5)
ALP SERPL-CCNC: 43 U/L — SIGNIFICANT CHANGE UP (ref 40–120)
ALT FLD-CCNC: 33 U/L DA — SIGNIFICANT CHANGE UP (ref 10–60)
ANION GAP SERPL CALC-SCNC: 10 MMOL/L — SIGNIFICANT CHANGE UP (ref 5–17)
AST SERPL-CCNC: 29 U/L — SIGNIFICANT CHANGE UP (ref 10–40)
BASOPHILS # BLD AUTO: 0.01 K/UL — SIGNIFICANT CHANGE UP (ref 0–0.2)
BASOPHILS NFR BLD AUTO: 0.1 % — SIGNIFICANT CHANGE UP (ref 0–2)
BILIRUB SERPL-MCNC: 1 MG/DL — SIGNIFICANT CHANGE UP (ref 0.2–1.2)
BUN SERPL-MCNC: 17 MG/DL — SIGNIFICANT CHANGE UP (ref 7–18)
CALCIUM SERPL-MCNC: 8.8 MG/DL — SIGNIFICANT CHANGE UP (ref 8.4–10.5)
CHLORIDE SERPL-SCNC: 98 MMOL/L — SIGNIFICANT CHANGE UP (ref 96–108)
CO2 SERPL-SCNC: 25 MMOL/L — SIGNIFICANT CHANGE UP (ref 22–31)
CREAT SERPL-MCNC: 1.37 MG/DL — HIGH (ref 0.5–1.3)
EOSINOPHIL # BLD AUTO: 0.04 K/UL — SIGNIFICANT CHANGE UP (ref 0–0.5)
EOSINOPHIL NFR BLD AUTO: 0.5 % — SIGNIFICANT CHANGE UP (ref 0–6)
GLUCOSE SERPL-MCNC: 137 MG/DL — HIGH (ref 70–99)
HCT VFR BLD CALC: 35.8 % — LOW (ref 39–50)
HGB BLD-MCNC: 12.3 G/DL — LOW (ref 13–17)
IMM GRANULOCYTES NFR BLD AUTO: 0.3 % — SIGNIFICANT CHANGE UP (ref 0–1.5)
LYMPHOCYTES # BLD AUTO: 0.93 K/UL — LOW (ref 1–3.3)
LYMPHOCYTES # BLD AUTO: 10.8 % — LOW (ref 13–44)
MAGNESIUM SERPL-MCNC: 1.7 MG/DL — SIGNIFICANT CHANGE UP (ref 1.6–2.6)
MCHC RBC-ENTMCNC: 30.5 PG — SIGNIFICANT CHANGE UP (ref 27–34)
MCHC RBC-ENTMCNC: 34.4 GM/DL — SIGNIFICANT CHANGE UP (ref 32–36)
MCV RBC AUTO: 88.8 FL — SIGNIFICANT CHANGE UP (ref 80–100)
MONOCYTES # BLD AUTO: 0.62 K/UL — SIGNIFICANT CHANGE UP (ref 0–0.9)
MONOCYTES NFR BLD AUTO: 7.2 % — SIGNIFICANT CHANGE UP (ref 2–14)
NEUTROPHILS # BLD AUTO: 6.99 K/UL — SIGNIFICANT CHANGE UP (ref 1.8–7.4)
NEUTROPHILS NFR BLD AUTO: 81.1 % — HIGH (ref 43–77)
NRBC # BLD: 0 /100 WBCS — SIGNIFICANT CHANGE UP (ref 0–0)
PHOSPHATE SERPL-MCNC: 2.8 MG/DL — SIGNIFICANT CHANGE UP (ref 2.5–4.5)
PLATELET # BLD AUTO: 180 K/UL — SIGNIFICANT CHANGE UP (ref 150–400)
POTASSIUM SERPL-MCNC: 3.8 MMOL/L — SIGNIFICANT CHANGE UP (ref 3.5–5.3)
POTASSIUM SERPL-SCNC: 3.8 MMOL/L — SIGNIFICANT CHANGE UP (ref 3.5–5.3)
PROT SERPL-MCNC: 7.2 G/DL — SIGNIFICANT CHANGE UP (ref 6–8.3)
RBC # BLD: 4.03 M/UL — LOW (ref 4.2–5.8)
RBC # FLD: 12.9 % — SIGNIFICANT CHANGE UP (ref 10.3–14.5)
SODIUM SERPL-SCNC: 133 MMOL/L — LOW (ref 135–145)
WBC # BLD: 8.62 K/UL — SIGNIFICANT CHANGE UP (ref 3.8–10.5)
WBC # FLD AUTO: 8.62 K/UL — SIGNIFICANT CHANGE UP (ref 3.8–10.5)

## 2020-07-16 PROCEDURE — 99233 SBSQ HOSP IP/OBS HIGH 50: CPT | Mod: GC

## 2020-07-16 RX ORDER — METOPROLOL TARTRATE 50 MG
25 TABLET ORAL DAILY
Refills: 0 | Status: DISCONTINUED | OUTPATIENT
Start: 2020-07-17 | End: 2020-07-17

## 2020-07-16 RX ORDER — METOPROLOL TARTRATE 50 MG
25 TABLET ORAL ONCE
Refills: 0 | Status: COMPLETED | OUTPATIENT
Start: 2020-07-16 | End: 2020-07-16

## 2020-07-16 RX ORDER — ASPIRIN/CALCIUM CARB/MAGNESIUM 324 MG
81 TABLET ORAL DAILY
Refills: 0 | Status: DISCONTINUED | OUTPATIENT
Start: 2020-07-16 | End: 2020-07-18

## 2020-07-16 RX ORDER — SODIUM CHLORIDE 9 MG/ML
1 INJECTION INTRAMUSCULAR; INTRAVENOUS; SUBCUTANEOUS AT BEDTIME
Refills: 0 | Status: COMPLETED | OUTPATIENT
Start: 2020-07-16 | End: 2020-07-16

## 2020-07-16 RX ADMIN — SODIUM CHLORIDE 1 GRAM(S): 9 INJECTION INTRAMUSCULAR; INTRAVENOUS; SUBCUTANEOUS at 21:33

## 2020-07-16 RX ADMIN — TAMSULOSIN HYDROCHLORIDE 0.4 MILLIGRAM(S): 0.4 CAPSULE ORAL at 21:33

## 2020-07-16 RX ADMIN — Medication 25 MILLIGRAM(S): at 17:13

## 2020-07-16 RX ADMIN — HEPARIN SODIUM 5000 UNIT(S): 5000 INJECTION INTRAVENOUS; SUBCUTANEOUS at 17:13

## 2020-07-16 RX ADMIN — ATORVASTATIN CALCIUM 10 MILLIGRAM(S): 80 TABLET, FILM COATED ORAL at 21:33

## 2020-07-16 RX ADMIN — LOSARTAN POTASSIUM 50 MILLIGRAM(S): 100 TABLET, FILM COATED ORAL at 05:33

## 2020-07-16 RX ADMIN — PANTOPRAZOLE SODIUM 40 MILLIGRAM(S): 20 TABLET, DELAYED RELEASE ORAL at 07:14

## 2020-07-16 RX ADMIN — HEPARIN SODIUM 5000 UNIT(S): 5000 INJECTION INTRAVENOUS; SUBCUTANEOUS at 05:33

## 2020-07-16 NOTE — PROGRESS NOTE ADULT - PROBLEM SELECTOR PLAN 4
d/c tele per Dr. Zhong  probably vagal response  Hold beta blocker for now   - ECHO  EF 55-60%  - HR 60s-70s  Cardiology consulted Dr. Zhong

## 2020-07-16 NOTE — CHART NOTE - NSCHARTNOTEFT_GEN_A_CORE
Dr. Gelacio Hull  office called yesterday at 4PM and today at 830am to verify patient medication. Left contact information to be called back.

## 2020-07-16 NOTE — PROGRESS NOTE ADULT - PROBLEM SELECTOR PLAN 2
- Cr 2.4 on admission, improved with fluids.   - Cr 1.36 in AM. 1.47 in PM   - renal US shows no hydronephrosis  - started Losartan 50mg QD   - IVF 50cc/hr x 24 hrs  -avoid any nephrotoxic meds and iv contrast  -monitor BMP

## 2020-07-16 NOTE — PHYSICAL THERAPY INITIAL EVALUATION ADULT - GENERAL OBSERVATIONS, REHAB EVAL
Pt. found lying supine in NAD; on telemetry; cooperative and motivated during eval. Pt. can speak and understand English.

## 2020-07-16 NOTE — PROGRESS NOTE ADULT - PROBLEM SELECTOR PLAN 1
Na LEVEL 120 likely dehydration (hx of no salt intake as per daughter)   Na improved to 127 -> 130 ->131 ->132 with hydration   on candesartan at home  - regular diet  - monitor bmp  - IVF NSS 50cc/hr x 24 hrs

## 2020-07-16 NOTE — PROGRESS NOTE ADULT - ATTENDING COMMENTS
[facility] NEPHROLOGY  Mark Anthony Huffman M.D.  Calixto Jeffery D.O.  Bonnie Brown M.D.  Nuria Hollis, MSN, ANP-C  [phone]    [address] [place] [address]

## 2020-07-16 NOTE — PROGRESS NOTE ADULT - SUBJECTIVE AND OBJECTIVE BOX
PGY 1 Note discussed with supervising resident and primary attending    Patient is a 86y old  Male who presents with a chief complaint of Hyponatremia (15 Jul 2020 17:02)      INTERVAL HPI/OVERNIGHT EVENTS: Pt complains of dizziness overnight. Pt given meclizine with some relief. Pt w/ h/o vertigo. Pt refused morning labs this AM. Spoke to the pt and explained the need for blood test this AM. Pt verbally agreed to blood draw. Lab aware of pt consent.    Contact Pt PCP Dr. Gelacio Hull  for pt medication verification    MEDICATIONS  (STANDING):  atorvastatin 10 milliGRAM(s) Oral at bedtime  atropine Injectable 0.5 milliGRAM(s) IV Push once  heparin   Injectable 5000 Unit(s) SubCutaneous every 12 hours  losartan 50 milliGRAM(s) Oral daily  pantoprazole    Tablet 40 milliGRAM(s) Oral before breakfast  sodium chloride 0.9%. 1000 milliLiter(s) (50 mL/Hr) IV Continuous <Continuous>  tamsulosin 0.4 milliGRAM(s) Oral at bedtime    MEDICATIONS  (PRN):      __________________________________________________  REVIEW OF SYSTEMS:  CONSTITUTIONAL: No fever   EYES: no acute visual disturbances  NECK: No pain or stiffness  RESPIRATORY: No cough; No shortness of breath  CARDIOVASCULAR: No chest pain, no palpitations  GASTROINTESTINAL: No pain. No nausea or vomiting; No diarrhea   NEUROLOGICAL: No headache or numbness, no tremors. (+) dysmetria on L finger to nose.   MUSCULOSKELETAL: No joint pain, no muscle pain  GENITOURINARY: no dysuria, no frequency, no hesitancy  PSYCHIATRY: no depression   ALL OTHER ROS negative        Vital Signs Last 24 Hrs  T(C): 36.6 (16 Jul 2020 07:30), Max: 37.3 (15 Jul 2020 19:58)  T(F): 97.8 (16 Jul 2020 07:30), Max: 99.1 (15 Jul 2020 19:58)  HR: 72 (16 Jul 2020 07:30) (72 - 85)  BP: 165/62 (16 Jul 2020 07:30) (155/54 - 169/64)  BP(mean): --  RR: 18 (16 Jul 2020 07:30) (18 - 18)  SpO2: 97% (16 Jul 2020 07:30) (96% - 98%)    ________________________________________________  PHYSICAL EXAM:  GENERAL: NAD  HEENT: Normocephalic;  conjunctivae and sclerae clear; moist mucous membranes;   NECK : supple  CHEST/LUNG: Clear to auscultation bilaterally with good air entry   HEART: S1 S2  regular; no murmurs, gallops or rubs  ABDOMEN: Soft, Nontender, Nondistended; Bowel sounds present  EXTREMITIES: no cyanosis; no edema; no calf tenderness  SKIN: warm and dry; no rash  NERVOUS SYSTEM:  Awake and alert; Oriented  to place, person and time ; no new deficits  PSYCH: Mildly anxious   _________________________________________________  LABS:                        12.0   6.84  )-----------( 180      ( 15 Jul 2020 07:30 )             34.1     07-15    132<L>  |  98  |  24<H>  ----------------------------<  154<H>  4.3   |  24  |  1.47<H>    Ca    8.8      15 Jul 2020 13:55          CAPILLARY BLOOD GLUCOSE            RADIOLOGY & ADDITIONAL TESTS:        < from: Transthoracic Echocardiogram (07.14.20 @ 07:30) >  CONCLUSIONS:  1. Normal mitral valve. Mild mitral regurgitation.  2. Normal trileaflet aortic valve. Mild aortic  regurgitation.  3. Aortic Root: 2.9 cm.    4. Normal left atrium.  5. Normal left ventricular internal dimensions and wall  thicknesses.  6. Normal Left Ventricular Systolic Function,  (EF = 55 to  60%)  7. Grade I diastolic dysfunction (Impaired relaxation).  8. Normal right atrium.  9. Normal right ventricular size and systolic function  (TAPSE  1.8cm,tissue doppler .11m/s).  10. RA Pressure is8 mm Hg.  11. RV systolic pressure is mildly increased at  39 mm Hg.  12. There is moderate-severe tricuspid regurgitation.  13. Normal pulmonic valve.  14. Agitated saline injection revealed bubbles in the left  heart, consistent with patent foramenovale or atrial  septal defect.  15. Normal pericardium with no pericardial effusion.    < end of copied text >  < from: US Duplex Carotid Arteries Complete, Bilateral (07.15.20 @ 12:07) >  Impression: Moderate calcific atheromatosis right carotid bifurcation. No flow-limiting stenosis bilateral extracranial carotids    < end of copied text >    < from: US Renal (07.15.20 @ 11:36) >  IMPRESSION:     No hydronephrosis.    There is a 1 cm cyst in the lower pole of the left kidney.    < end of copied text >      Imaging Personally Reviewed:  YES    Consultant(s) Notes Reviewed:   YES    Care Discussed with Consultants : YES     Plan of care was discussed with patient and /or primary care giver; all questions and concerns were addressed and care was aligned with patient's wishes.

## 2020-07-16 NOTE — PROGRESS NOTE ADULT - ASSESSMENT
Patient is a 86y Cantonese speaking Male with HTN on Metoprolol/ Candesartan, HLD, GERD, BPH, vertigo p/w dizziness, frequent falls, chest pain and nausea. Pt found to be bradycardic with elevated serum creatinine and hyponatremia. Nephrology consulted for Elevated serum creatinine/ Hyponatremia.     1. MARTITA- pre-renal in the setting of nausea with decreased PO intake. MARTITA resolved with IVF. Renal function at baseline. Renal US with no hydro. Strict I/Os. Avoid nephrotoxins/ NSAIDs/ RCA. Monitor BMP.  2. Hyponatremia- likely hypovolemic hyponatremia. Serum sodium improving on IVF. Will give NaCl 1g PO x1 tonight. TSH wnl. Monitor serum Na.   3. CKD -3- pt likely with underlying CKD; last SCr 1.2-1.4 in 2016?baseline. Spot Upr/Cr 0.18 on ARB. Will defer secondary w/u as an outpt. Avoid nephrotoxins  4. HTN 2/2 CKD- BP elevated. Recc to restart Metoprolol if ok by Cards. c/w Losartan 50mg PO qd. Monitor BP.   5. BPH- c/w flomax

## 2020-07-16 NOTE — PROGRESS NOTE ADULT - ATTENDING COMMENTS
Patient seen and examined earlier this afternoon; Agree with NP A/P above with editing as needed. My independent assessment, findings on exam, diagnosis and plan of care as listed below. Discussed with NEHEIMAS Enrique.    83 y/o male, from home,  Cantonese speaking primarily but can speak and understand  English,  with PMH of HTN on Metoprolol, BPH, GERD, chronic vertigo who presented with persistent dizziness for the past 2 weeks with intermittent chest discomfort. In the ED pt found to be  bradycardic.  EKG showed  sinus bradycardia in the 40s. Sodium level 120.    Pt admitted to telemetry with symptomatic sinus bradycardia, hyponatremia and MARTITA     Patient doing much better today ; No more dizziness today. No fever, chills, SOB, palpitations, chest pain, nausea, vomiting, diarrhea,. No acute distress noted. As per patient he was on Losartan/ Olmesartan in the past with no untoward issues, but was switched by his Insurance and pharmacy benefits to candesartan     Vital Signs Last 24 Hrs  T(C): 37.1 (15 Jul 2020 16:22), Max: 37.1 (15 Jul 2020 16:22)  T(F): 98.7 (15 Jul 2020 16:22), Max: 98.7 (15 Jul 2020 16:22)  HR: 73 (15 Jul 2020 16:22) (57 - 79)  BP: 155/54 (15 Jul 2020 16:22) (131/50 - 175/89)  RR: 18 (15 Jul 2020 16:22) (18 - 20)  SpO2: 96% (15 Jul 2020 16:22) (96% - 98%)    P/E:  elderly male, appears more hydrated, not in acute distress  Neuro: AAO x3; No focal deficits  CVS: S1S2 present, regular  Resp: BLAE+, No wheeze or Rhonchi  GI: Soft, BS+, NT  Extr: No edema or calf tenderness    Labs:                        12.0   6.84  )-----------( 180      ( 15 Jul 2020 07:30 )             34.1   07-15    132<L>  |  98  |  24<H>  ----------------------------<  154<H>  4.3   |  24  |  1.47<H>    Ca    8.8      15 Jul 2020 13:55  Phos  3.7     07-14  Mg     2.0     07-14    TPro  6.5  /  Alb  3.2<L>  /  TBili  0.5  /  DBili  x   /  AST  36  /  ALT  37  /  AlkPhos  42  07-14    Transthoracic Echocardiogram (07.14.20 @ 07:30)    CONCLUSIONS:  1. Normal mitral valve. Mild mitral regurgitation.  2. Normal trileaflet aortic valve. Mild aortic regurgitation.  3. Aortic Root: 2.9 cm.  4. Normal left atrium.  5. Normal left ventricular internal dimensions and wall thicknesses.  6. Normal Left Ventricular Systolic Function,  (EF = 55 to 60%)  7. Grade I diastolic dysfunction (Impaired relaxation).  8. Normal right atrium.  9. Normal right ventricular size and systolic function (TAPSE  1.8cm,tissue doppler .11m/s).  10. RA Pressure is8 mm Hg.  11. RV systolic pressure is mildly increased at  39 mm Hg.  12. There is moderate-severe tricuspid regurgitation.  13. Normal pulmonic valve.  14. Agitated saline injection revealed bubbles in the left heart, consistent with patent foramen ovale or atrial septal defect.  15. Normal pericardium with no pericardial effusion.    D/D:  Dizziness likely due to volume depletion due to dehydration  MARTITA due to Prerenal azotemia  Hypovolemic hyponatremia resolving well.  Suspected Symptomatic Sinus Bradycardia  Uncontrolled HTN likely due to withholding meds (Candesartan)  Hx BPH and Gout    Plan:  Continue Tele; No significant Bradycardia on Tele. Hold beta blocker;   Cardio follow up; Echo WNL  Resume low dose ARB; Renal function much improved;   Will substitute with Losartan 25 mg daily and titrate up to get BP control.   Na stable; d/w Renal follow up appreciated; will resume Normal saline at 50cc/hr x 24 hrs  Rest as per PGy1 above  DVT ppx    Discussed with patient findings and plan of care  Discussed with PGY1 Dr. Ordaz and PGY2 Dr. Cantrell and RN Patient seen and examined earlier this morning with PGY1; Agree with NP A/P above with editing as needed. My independent assessment, findings on exam, diagnosis and plan of care as listed below. Discussed with Dr. Ordaz    81 y/o male, from home, Cantonese speaking primarily but can speak and understand  English,  with PMH of HTN on Metoprolol, BPH, GERD, chronic vertigo who presented with persistent dizziness for the past 2 weeks with intermittent chest discomfort. In the ED pt found to be  bradycardic.  EKG showed  sinus bradycardia in the 40s. Sodium level 120.    Pt admitted to telemetry with symptomatic sinus bradycardia, hyponatremia and MARTITA     Patient continues to do improve; Had transient vertiginous symptoms last night needing Meclizine but no more dizziness today. No fever, chills, SOB, palpitations, chest pain, nausea, vomiting, diarrhea. Patient prefer to go home tomorrow as he is worried if he gets the dizzy spells.     Vital Signs Last 24 Hrs  T(C): 36.6 (16 Jul 2020 16:05), Max: 37.3 (15 Jul 2020 19:58)  T(F): 97.8 (16 Jul 2020 16:05), Max: 99.1 (15 Jul 2020 19:58)  HR: 88 (16 Jul 2020 16:05) (72 - 89)  BP: 162/76 (16 Jul 2020 16:05) (161/61 - 172/62)  BP(mean): 90 (16 Jul 2020 11:37) (87 - 90)  RR: 19 (16 Jul 2020 16:05) (18 - 19)  SpO2: 98% (16 Jul 2020 16:05) (96% - 98%)    P/E:  elderly male, not in acute distress  Neuro: AAO x3; No focal deficits; ambulate independently with PT  CVS: S1S2 present, regular  Resp: BLAE+, No wheeze or Rhonchi  GI: Soft, BS+, NT  Extr: No edema or calf tenderness    Labs:                          12.3   8.62  )-----------( 180      ( 16 Jul 2020 10:08 )             35.8   07-16    133<L>  |  98  |  17  ----------------------------<  137<H>  3.8   |  25  |  1.37<H>    Ca    8.8      16 Jul 2020 10:08  Phos  2.8     07-16  Mg     1.7     07-16    TPro  7.2  /  Alb  3.3<L>  /  TBili  1.0  /  DBili  x   /  AST  29  /  ALT  33  /  AlkPhos  43  07-16    D/D:  Dizziness likely due to volume depletion due to dehydration much improved  BPPV  MARTITA due to Prerenal azotemia resolving well  Hypovolemic hyponatremia resolving well.  Suspected Symptomatic Sinus Bradycardia likely due to beta blocker, improved  Uncontrolled HTN likely due to withholding meds (Candesartan)  Hx BPH and Gout    Plan:  Discussed with Cardio Dr. Zhong  Resume half home dose Metoprolol; Was on ER 50 mg once daily; will give 25 mg today  Continue low dose Losartan (on d/c Olmesartan 8 mg half from home dose)   Monitor renal function closely; If renal function worsens, will hold ARB  Continue Tele; No significant Bradycardia on Tele did had some Tachy episodes.  Echo with PFO vs ASD; will check with cardio if any further intervention indicated  D/C IV fluids; Renal f/u noted; d/w Dr. Brown, will give salt tab 1 gm today and check BMP and BP    Rest as per PGY1 above  DVT ppx    Discussed with patient findings and plan of care  Discussed with PGY1 Dr. Ordaz and PGY2 Dr. Cantrell and RN  D/C Home in AM

## 2020-07-16 NOTE — PROGRESS NOTE ADULT - ASSESSMENT
81 y/o male, from home,  Cantonese speaking with PMH of HTN on metoprol, BPH, GERD, chronic vertigo who presented with persistent dizziness for the past 2 weeks with intermittent chest discomfort. In the ED, pt found to be bradycardic w/ Sinus bradycardia 40s on EKG. Sodium level 120. Pt admitted to telemetry with symptomatic sinus bradycardia, hyponatremia and MARTITA. Troponin negative. Pt seen at bedside, alert, awake, denies chest pain, SOB, palpitation. Pt on IVF for sodium repletion.

## 2020-07-16 NOTE — PROGRESS NOTE ADULT - SUBJECTIVE AND OBJECTIVE BOX
[facility] NEPHROLOGY- PROGRESS NOTE    Patient is a 86y Cantonese speaking Male with HTN on Metoprolol/ Candesartan, HLD, GERD, BPH, vertigo p/w dizziness, frequent falls, chest pain and nausea. Pt found to be bradycardic with elevated serum creatinine and hyponatremia. Nephrology consulted for Elevated serum creatinine/ Hyponatremia.     Hospital Medications: Medications reviewed.  REVIEW OF SYSTEMS:  CONSTITUTIONAL: No fevers or chills; +dizziness o/n; denies at this time  RESPIRATORY: No shortness of breath  CARDIOVASCULAR: No chest pain.  GASTROINTESTINAL: No nausea, vomiting, diarrhea or abdominal pain.   VASCULAR: No bilateral lower extremity edema.     VITALS:  T(F): 97.6 (20 @ 11:20), Max: 99.1 (07-15-20 @ 19:58)  HR: 89 (20 @ 11:37)  BP: 172/62 (20 @ 11:37)  RR: 18 (20 @ 11:20)  SpO2: 97% (20 @ 11:37)  Wt(kg): --    07-15 @ 07:  -   @ 07:00  --------------------------------------------------------  IN: 450 mL / OUT: 0 mL / NET: 450 mL     @ 07:  -   @ 14:13  --------------------------------------------------------  IN: 200 mL / OUT: 0 mL / NET: 200 mL      PHYSICAL EXAM:  Constitutional: NAD  Neurological: Awake and Alert  HEENT: anicteric sclera,   Respiratory: CTA b/l  Cardiovascular: S1, S2, RRR  Gastrointestinal: BS+, soft, NT/ND  Extremities: No peripheral edema      LABS:    133 <--, 132 <--, 131 <--, 130 <--, 127 <--, 120 <--  133<L>  |  98  |  17  ----------------------------<  137<H>  3.8   |  25  |  1.37<H>    Ca    8.8      2020 10:08  Phos  2.8     -  Mg     1.7     -    TPro  7.2  /  Alb  3.3<L>  /  TBili  1.0  /  DBili      /  AST  29  /  ALT  33  /  AlkPhos  43  -    Creatinine Trend: 1.37 <--, 1.47 <--, 1.36 <--, 1.54 <--, 1.83 <--, 2.40 <--                        12.3   8.62  )-----------( 180      ( 2020 10:08 )             35.8     Urine Studies:  Urinalysis Basic - ( 2020 22:11 )    Color: Yellow / Appearance: Clear / S.020 / pH:   Gluc:  / Ketone: Negative  / Bili: Negative / Urobili: Negative   Blood:  / Protein: 15 / Nitrite: Negative   Leuk Esterase: Negative / RBC: 2-5 /HPF / WBC 0-2 /HPF   Sq Epi:  / Non Sq Epi: Few /HPF / Bacteria: Trace /HPF      Chloride, Random Urine: <10 mmol/L ( @ 22:11)  Creatinine, Random Urine: 122 mg/dL ( @ 22:11)  Sodium, Random Urine: 12 mmol/L ( @ 22:11)  Osmolality, Random Urine: 372 mos/kg ( @ 22:09)

## 2020-07-16 NOTE — PROGRESS NOTE ADULT - PROBLEM SELECTOR PLAN 3
p/w dizziness for 2 week  hx of vertigo EKG shows mercy sinus 40s  telemetry monitoring   stress test In 2016 shows EF >70%  - Echo 55-60% EF  - Carotid doppler shows Moderate calcific atheromatosis right carotid bifurcation  Dr Zhong cardiology on board

## 2020-07-17 LAB
ALBUMIN SERPL ELPH-MCNC: 2.9 G/DL — LOW (ref 3.5–5)
ALP SERPL-CCNC: 32 U/L — LOW (ref 40–120)
ALT FLD-CCNC: 28 U/L DA — SIGNIFICANT CHANGE UP (ref 10–60)
ANION GAP SERPL CALC-SCNC: 5 MMOL/L — SIGNIFICANT CHANGE UP (ref 5–17)
AST SERPL-CCNC: 25 U/L — SIGNIFICANT CHANGE UP (ref 10–40)
BASOPHILS # BLD AUTO: 0.03 K/UL — SIGNIFICANT CHANGE UP (ref 0–0.2)
BASOPHILS NFR BLD AUTO: 0.4 % — SIGNIFICANT CHANGE UP (ref 0–2)
BILIRUB SERPL-MCNC: 0.9 MG/DL — SIGNIFICANT CHANGE UP (ref 0.2–1.2)
BUN SERPL-MCNC: 20 MG/DL — HIGH (ref 7–18)
CALCIUM SERPL-MCNC: 8.4 MG/DL — SIGNIFICANT CHANGE UP (ref 8.4–10.5)
CHLORIDE SERPL-SCNC: 101 MMOL/L — SIGNIFICANT CHANGE UP (ref 96–108)
CO2 SERPL-SCNC: 27 MMOL/L — SIGNIFICANT CHANGE UP (ref 22–31)
CREAT SERPL-MCNC: 1.34 MG/DL — HIGH (ref 0.5–1.3)
EOSINOPHIL # BLD AUTO: 0.21 K/UL — SIGNIFICANT CHANGE UP (ref 0–0.5)
EOSINOPHIL NFR BLD AUTO: 2.6 % — SIGNIFICANT CHANGE UP (ref 0–6)
GLUCOSE SERPL-MCNC: 79 MG/DL — SIGNIFICANT CHANGE UP (ref 70–99)
HCT VFR BLD CALC: 33.1 % — LOW (ref 39–50)
HGB BLD-MCNC: 11.5 G/DL — LOW (ref 13–17)
IMM GRANULOCYTES NFR BLD AUTO: 0.4 % — SIGNIFICANT CHANGE UP (ref 0–1.5)
LYMPHOCYTES # BLD AUTO: 1.83 K/UL — SIGNIFICANT CHANGE UP (ref 1–3.3)
LYMPHOCYTES # BLD AUTO: 22.6 % — SIGNIFICANT CHANGE UP (ref 13–44)
MAGNESIUM SERPL-MCNC: 1.7 MG/DL — SIGNIFICANT CHANGE UP (ref 1.6–2.6)
MCHC RBC-ENTMCNC: 30.3 PG — SIGNIFICANT CHANGE UP (ref 27–34)
MCHC RBC-ENTMCNC: 34.7 GM/DL — SIGNIFICANT CHANGE UP (ref 32–36)
MCV RBC AUTO: 87.3 FL — SIGNIFICANT CHANGE UP (ref 80–100)
MONOCYTES # BLD AUTO: 0.77 K/UL — SIGNIFICANT CHANGE UP (ref 0–0.9)
MONOCYTES NFR BLD AUTO: 9.5 % — SIGNIFICANT CHANGE UP (ref 2–14)
NEUTROPHILS # BLD AUTO: 5.23 K/UL — SIGNIFICANT CHANGE UP (ref 1.8–7.4)
NEUTROPHILS NFR BLD AUTO: 64.5 % — SIGNIFICANT CHANGE UP (ref 43–77)
NRBC # BLD: 0 /100 WBCS — SIGNIFICANT CHANGE UP (ref 0–0)
PHOSPHATE SERPL-MCNC: 3 MG/DL — SIGNIFICANT CHANGE UP (ref 2.5–4.5)
PLATELET # BLD AUTO: 170 K/UL — SIGNIFICANT CHANGE UP (ref 150–400)
POTASSIUM SERPL-MCNC: 3.9 MMOL/L — SIGNIFICANT CHANGE UP (ref 3.5–5.3)
POTASSIUM SERPL-SCNC: 3.9 MMOL/L — SIGNIFICANT CHANGE UP (ref 3.5–5.3)
PROT SERPL-MCNC: 6.2 G/DL — SIGNIFICANT CHANGE UP (ref 6–8.3)
RBC # BLD: 3.79 M/UL — LOW (ref 4.2–5.8)
RBC # FLD: 12.7 % — SIGNIFICANT CHANGE UP (ref 10.3–14.5)
SODIUM SERPL-SCNC: 133 MMOL/L — LOW (ref 135–145)
WBC # BLD: 8.1 K/UL — SIGNIFICANT CHANGE UP (ref 3.8–10.5)
WBC # FLD AUTO: 8.1 K/UL — SIGNIFICANT CHANGE UP (ref 3.8–10.5)

## 2020-07-17 PROCEDURE — 99233 SBSQ HOSP IP/OBS HIGH 50: CPT | Mod: GC

## 2020-07-17 RX ORDER — LOSARTAN POTASSIUM 100 MG/1
50 TABLET, FILM COATED ORAL DAILY
Refills: 0 | Status: DISCONTINUED | OUTPATIENT
Start: 2020-07-17 | End: 2020-07-17

## 2020-07-17 RX ORDER — SODIUM CHLORIDE 9 MG/ML
2 INJECTION INTRAMUSCULAR; INTRAVENOUS; SUBCUTANEOUS ONCE
Refills: 0 | Status: COMPLETED | OUTPATIENT
Start: 2020-07-17 | End: 2020-07-17

## 2020-07-17 RX ORDER — LOSARTAN POTASSIUM 100 MG/1
100 TABLET, FILM COATED ORAL DAILY
Refills: 0 | Status: DISCONTINUED | OUTPATIENT
Start: 2020-07-17 | End: 2020-07-17

## 2020-07-17 RX ORDER — LOSARTAN POTASSIUM 100 MG/1
50 TABLET, FILM COATED ORAL ONCE
Refills: 0 | Status: COMPLETED | OUTPATIENT
Start: 2020-07-17 | End: 2020-07-17

## 2020-07-17 RX ORDER — NIFEDIPINE 30 MG
30 TABLET, EXTENDED RELEASE 24 HR ORAL ONCE
Refills: 0 | Status: COMPLETED | OUTPATIENT
Start: 2020-07-17 | End: 2020-07-17

## 2020-07-17 RX ORDER — METOPROLOL TARTRATE 50 MG
1 TABLET ORAL
Qty: 0 | Refills: 0 | DISCHARGE

## 2020-07-17 RX ORDER — NIFEDIPINE 30 MG
1 TABLET, EXTENDED RELEASE 24 HR ORAL
Qty: 30 | Refills: 0
Start: 2020-07-17 | End: 2020-08-15

## 2020-07-17 RX ORDER — NIFEDIPINE 30 MG
30 TABLET, EXTENDED RELEASE 24 HR ORAL DAILY
Refills: 0 | Status: DISCONTINUED | OUTPATIENT
Start: 2020-07-17 | End: 2020-07-17

## 2020-07-17 RX ORDER — NIFEDIPINE 30 MG
60 TABLET, EXTENDED RELEASE 24 HR ORAL DAILY
Refills: 0 | Status: DISCONTINUED | OUTPATIENT
Start: 2020-07-17 | End: 2020-07-18

## 2020-07-17 RX ORDER — METOPROLOL TARTRATE 50 MG
25 TABLET ORAL
Refills: 0 | Status: DISCONTINUED | OUTPATIENT
Start: 2020-07-17 | End: 2020-07-17

## 2020-07-17 RX ORDER — METOPROLOL TARTRATE 50 MG
25 TABLET ORAL DAILY
Refills: 0 | Status: DISCONTINUED | OUTPATIENT
Start: 2020-07-17 | End: 2020-07-18

## 2020-07-17 RX ORDER — CANDESARTAN CILEXETIL 8 MG/1
1 TABLET ORAL
Qty: 0 | Refills: 0 | DISCHARGE

## 2020-07-17 RX ORDER — METOPROLOL TARTRATE 50 MG
1 TABLET ORAL
Qty: 30 | Refills: 0
Start: 2020-07-17 | End: 2020-08-15

## 2020-07-17 RX ORDER — ASPIRIN/CALCIUM CARB/MAGNESIUM 324 MG
1 TABLET ORAL
Qty: 30 | Refills: 0
Start: 2020-07-17 | End: 2020-08-15

## 2020-07-17 RX ORDER — CANDESARTAN CILEXETIL 8 MG/1
1 TABLET ORAL
Qty: 30 | Refills: 0
Start: 2020-07-17 | End: 2020-08-15

## 2020-07-17 RX ORDER — LOSARTAN POTASSIUM 100 MG/1
25 TABLET, FILM COATED ORAL DAILY
Refills: 0 | Status: DISCONTINUED | OUTPATIENT
Start: 2020-07-18 | End: 2020-07-18

## 2020-07-17 RX ADMIN — HEPARIN SODIUM 5000 UNIT(S): 5000 INJECTION INTRAVENOUS; SUBCUTANEOUS at 05:34

## 2020-07-17 RX ADMIN — Medication 60 MILLIGRAM(S): at 21:17

## 2020-07-17 RX ADMIN — SODIUM CHLORIDE 2 GRAM(S): 9 INJECTION INTRAMUSCULAR; INTRAVENOUS; SUBCUTANEOUS at 12:56

## 2020-07-17 RX ADMIN — Medication 25 MILLIGRAM(S): at 05:34

## 2020-07-17 RX ADMIN — Medication 81 MILLIGRAM(S): at 12:46

## 2020-07-17 RX ADMIN — Medication 30 MILLIGRAM(S): at 17:15

## 2020-07-17 RX ADMIN — LOSARTAN POTASSIUM 50 MILLIGRAM(S): 100 TABLET, FILM COATED ORAL at 01:52

## 2020-07-17 RX ADMIN — Medication 30 MILLIGRAM(S): at 12:56

## 2020-07-17 RX ADMIN — ATORVASTATIN CALCIUM 10 MILLIGRAM(S): 80 TABLET, FILM COATED ORAL at 21:17

## 2020-07-17 RX ADMIN — HEPARIN SODIUM 5000 UNIT(S): 5000 INJECTION INTRAVENOUS; SUBCUTANEOUS at 21:16

## 2020-07-17 RX ADMIN — TAMSULOSIN HYDROCHLORIDE 0.4 MILLIGRAM(S): 0.4 CAPSULE ORAL at 21:17

## 2020-07-17 RX ADMIN — PANTOPRAZOLE SODIUM 40 MILLIGRAM(S): 20 TABLET, DELAYED RELEASE ORAL at 05:34

## 2020-07-17 RX ADMIN — Medication 25 MILLIGRAM(S): at 12:57

## 2020-07-17 NOTE — PROGRESS NOTE ADULT - ATTENDING COMMENTS
Patient seen and examined earlier this afternoon and later in the evening with PGY1 and PGY2; Agree with NP A/P above with editing as needed. My independent assessment, findings on exam, diagnosis and plan of care as listed below. Discussed with Dr. Ordaz and Dr. Cantrell    83 y/o male, from home, Cantonese speaking primarily but can speak and understand  English,  with PMH of HTN on Metoprolol, BPH, GERD, chronic vertigo who presented with persistent dizziness for the past 2 weeks with intermittent chest discomfort. In the ED pt found to be  bradycardic.  EKG showed  sinus bradycardia in the 40s. Sodium level 120.    Pt admitted to telemetry with symptomatic sinus bradycardia, hyponatremia and MARTITA all of which has resolved.    Patient feels better today without any dizziness or vertiginous symptoms. Planned for discharge but BP elevated significantly this evening so discharge plan cancelled.  No fever, chills, SOB, palpitations, chest pain, nausea, vomiting, diarrhea. Wife visiting at bedside this evening    Vital Signs Last 24 Hrs  T(C): 36.7 (17 Jul 2020 15:36), Max: 36.7 (17 Jul 2020 07:34)  T(F): 98.1 (17 Jul 2020 15:36), Max: 98.1 (17 Jul 2020 11:41)  HR: 60 (17 Jul 2020 16:55) (58 - 72)  BP: 175/55 (17 Jul 2020 16:55) (143/54 - 185/57)  RR: 18 (17 Jul 2020 15:36) (18 - 18)  SpO2: 96% (17 Jul 2020 15:36) (96% - 98%)    P/E:  elderly male, not in acute distress, eating well, ambulating independently  Neuro: AAO x3; No focal deficits;   CVS: S1S2 present, regular  Resp: BLAE+, No wheeze or Rhonchi  GI: Soft, BS+, NT  Extr: No edema or calf tenderness    Labs:                          12.3   8.62  )-----------( 180      ( 16 Jul 2020 10:08 )             35.8   07-16    133<L>  |  98  |  17  ----------------------------<  137<H>  3.8   |  25  |  1.37<H>    Ca    8.8      16 Jul 2020 10:08  Phos  2.8     07-16  Mg     1.7     07-16    TPro  7.2  /  Alb  3.3<L>  /  TBili  1.0  /  DBili  x   /  AST  29  /  ALT  33  /  AlkPhos  43  07-16    D/D:  Uncontrolled HTN partly related to Salt tabs  Dizziness likely due to volume depletion due to dehydration resolved  Possible BPPV  MARTITA due to Prerenal azotemia resolved  Hypovolemic hyponatremia resolved  Suspected Symptomatic Sinus Bradycardia likely due to beta blocker, improved and stable   Hx BPH and Gout    Plan:  Continue Metoprolol ER 25 mg daily; Added Nifedipine earlier this morning 30 mg increased to 60 mg this evening due to signifcant elevation in BP  Continue low dose Losartan (on d/c Olmesartan 8 mg half from home dose); May need to increase to 50 mg back in AM if SBP more than 150 mm Hg tomorrow.   Monitor renal function closely; If renal function worsens, will hold ARB; has been stable past 3 days with ARB  Echo with PFO vs ASD; d/w Cardio; no intervention indicated as patient does not have any evidence of ischemic stroke.   Rest as per PGY1 above  DVT ppx    Discussed with patient and wife at bedside findings and plan of care  Discussed with PGY1 Dr. Ordaz and PGY2 Dr. Cantrell and RN  D/C Home in AM if stable

## 2020-07-17 NOTE — PROGRESS NOTE ADULT - ASSESSMENT
81 y/o male, from home,  Cantonese speaking with PMH of HTN on metoprol, BPH, GERD, chronic vertigo who presented with persistent dizziness for the past 2 weeks with intermittent chest discomfort. In the ED, pt found to be bradycardic w/ Sinus bradycardia 40s on EKG. Sodium level 120. Pt admitted to telemetry with symptomatic sinus bradycardia, hypovolemic hyponatremia and MARTITA on CKD. Troponin negative. Pt seen at bedside, alert, awake, denies chest pain, SOB, palpitation. Pt was on IVF for sodium repletion.

## 2020-07-17 NOTE — PROGRESS NOTE ADULT - PROBLEM SELECTOR PLAN 3
Dizziness probably from hypovolemic hyponatremia and vertigo  p/w dizziness for 2 week  hx of vertigo EKG shows mercy sinus 40s  stress test In 2016 shows EF >70%  - Echo 55-60% EF  - Carotid doppler shows Moderate calcific atheromatosis right carotid bifurcation  Dr Zhong cardiology on board

## 2020-07-17 NOTE — PROGRESS NOTE ADULT - PROBLEM SELECTOR PLAN 1
Na LEVEL 120 likely dehydration (hx of no salt intake as per daughter)   Na improved to 127 -> 130 ->131 ->132 -> 133 with hydration   on candesartan at home  - regular diet  - monitor bmp  - S/p IVF NS, and oral 1g NaCl.

## 2020-07-17 NOTE — PROGRESS NOTE ADULT - PROBLEM SELECTOR PLAN 6
Pt has hx of htn  on candesartan 16mg qd, metoprolol 50mg   hold metoprolol in setting of bradycardia  -On Losartan 50mg
Pt has hx of htn  on candesartan 16mg qd, metoprolol 50mg   held metoprolol in setting of bradycardia  -On Losartan 50mg  -Nifedipine 30mg started then adjusted to 60mg w/ -190 in PM  -Additional Nifedipine 30mg given in PM due to -190
Pt has hx of htn  on candesartan 16mg qd, metoprolol 50mg   hold metoprolol in setting of bradycardia  holding losartan in setting of MARTITA
Pt has hx of htn  on candesartan 16mg qd, metoprolol 50mg   hold metoprolol in setting of bradycardia  holding losartan in setting of MARTITA

## 2020-07-17 NOTE — PROGRESS NOTE ADULT - SUBJECTIVE AND OBJECTIVE BOX
PGY 1 Note discussed with supervising resident and primary attending    Patient is a 86y old  Male who presents with a chief complaint of Hyponatremia (17 Jul 2020 13:08)      INTERVAL HPI/OVERNIGHT EVENTS: Pt seen and examined at bedside. Pt comfortable. Pt with increased blood pressure , repeat BP 1 hr later [id number]. Pt given nifedipine 30mg in addition. Pt sodium level stable at 133. Pt denies chest pain, vertigo episodes.    MEDICATIONS  (STANDING):  aspirin  chewable 81 milliGRAM(s) Oral daily  atorvastatin 10 milliGRAM(s) Oral at bedtime  heparin   Injectable 5000 Unit(s) SubCutaneous every 12 hours  metoprolol succinate ER 25 milliGRAM(s) Oral daily  NIFEdipine XL 60 milliGRAM(s) Oral daily  pantoprazole    Tablet 40 milliGRAM(s) Oral before breakfast  sodium chloride 0.9%. 1000 milliLiter(s) (50 mL/Hr) IV Continuous <Continuous>  tamsulosin 0.4 milliGRAM(s) Oral at bedtime    MEDICATIONS  (PRN):      __________________________________________________  REVIEW OF SYSTEMS:  CONSTITUTIONAL: No fever   EYES: no acute visual disturbances  NECK: No pain or stiffness  RESPIRATORY: No cough; No shortness of breath  CARDIOVASCULAR: No chest pain, no palpitations  GASTROINTESTINAL: No pain. No nausea or vomiting; No diarrhea   NEUROLOGICAL: No headache or numbness, no tremors  MUSCULOSKELETAL: No joint pain, no muscle pain  GENITOURINARY: no dysuria, no frequency, no hesitancy  PSYCHIATRY: no depression , no anxiety  ALL OTHER ROS negative        Vital Signs Last 24 Hrs  T(C): 36.7 (17 Jul 2020 15:36), Max: 36.7 (17 Jul 2020 07:34)  T(F): 98.1 (17 Jul 2020 15:36), Max: 98.1 (17 Jul 2020 11:41)  HR: 60 (17 Jul 2020 16:55) (58 - 72)  BP: 175/55 (17 Jul 2020 16:55) (143/54 - 185/57)  BP(mean): --  RR: 18 (17 Jul 2020 15:36) (18 - 18)  SpO2: 96% (17 Jul 2020 15:36) (96% - 98%)    ________________________________________________  PHYSICAL EXAM:  GENERAL: NAD  HEENT: Normocephalic;  conjunctivae and sclerae clear; moist mucous membranes;   NECK : supple  CHEST/LUNG: Clear to auscultation bilaterally with good air entry   HEART: S1 S2  regular; no murmurs, gallops or rubs  ABDOMEN: Soft, Nontender, Nondistended; Bowel sounds present  EXTREMITIES: no cyanosis; no edema; no calf tenderness  SKIN: warm and dry; no rash  NERVOUS SYSTEM:  Awake and alert; Oriented  to place, person and time ; no new deficits    _________________________________________________  LABS:                        11.5   8.10  )-----------( 170      ( 17 Jul 2020 05:59 )             33.1     07-17    133<L>  |  101  |  20<H>  ----------------------------<  79  3.9   |  27  |  1.34<H>    Ca    8.4      17 Jul 2020 05:59  Phos  3.0     07-17  Mg     1.7     07-17    TPro  6.2  /  Alb  2.9<L>  /  TBili  0.9  /  DBili  x   /  AST  25  /  ALT  28  /  AlkPhos  32<L>  07-17        CAPILLARY BLOOD GLUCOSE            RADIOLOGY & ADDITIONAL TESTS:    Imaging Personally Reviewed:  YES    Consultant(s) Notes Reviewed:   YES    Care Discussed with Consultants : YES     Plan of care was discussed with patient and /or primary care giver; all questions and concerns were addressed and care was aligned with patient's wishes.

## 2020-07-17 NOTE — PROGRESS NOTE ADULT - PROBLEM SELECTOR PLAN 4
d/c tele per Dr. Zhong  probably vagal response  - Metoprolol succinate ER 25mg QD  - ECHO  EF 55-60%  - HR 60s-70s  Cardiology consulted Dr. Zhong

## 2020-07-17 NOTE — PROGRESS NOTE ADULT - ASSESSMENT
Patient is a 86y Cantonese speaking Male with HTN on Metoprolol/ Candesartan, HLD, GERD, BPH, vertigo p/w dizziness, frequent falls, chest pain and nausea. Pt found to be bradycardic with elevated serum creatinine and hyponatremia. Nephrology consulted for Elevated serum creatinine/ Hyponatremia.     1. MARTITA- pre-renal in the setting of nausea with decreased PO intake. MARTITA resolved with IVF. Renal function at baseline. Renal US with no hydro. Strict I/Os. Avoid nephrotoxins/ NSAIDs/ RCA. Monitor BMP.  2. Hyponatremia- likely hypovolemic hyponatremia. Serum sodium stabilized s/p IVF. s/p NaCl 1g PO x1 7/16; will give an extra NaCl 2g PO x1. TSH wnl. Monitor serum Na.   3. CKD -3- pt likely with underlying CKD; last SCr 1.2-1.4 in 2016?baseline. Spot Upr/Cr 0.18 on ARB. Will defer secondary w/u as an outpt. Avoid nephrotoxins  4. 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 Consider increasing Losartan to 75mg PO qd. Monitor BP.   5. BPH- c/w flomax

## 2020-07-17 NOTE — PROGRESS NOTE ADULT - PROBLEM SELECTOR PLAN 5
hx of bph    tamsulosin 0.4mg qd

## 2020-07-17 NOTE — CHART NOTE - NSCHARTNOTEFT_GEN_A_CORE
Call team notified 1am that patient /58 with HR 63. Flowsheet showed patient's SBPs in 160s throughout the day. Patient on standing losartan 50mg Qd, gave stat dose x1 and increased daily dose to 100mg Qd. Primary team to review tomorrow. Will continue to monitor BP.

## 2020-07-17 NOTE — PROGRESS NOTE ADULT - PROBLEM SELECTOR PLAN 8
hx of GERD   comtinue protonix
hx of GERD   comtinue protonix
hx of GERD  continue protonix
hx of GERD   comtinue protonix

## 2020-07-17 NOTE — PROGRESS NOTE ADULT - PROBLEM SELECTOR PLAN 7
pt has a hx of gout   on Uloric 40mg   no active tophi or gouty joint noted

## 2020-07-17 NOTE — PROGRESS NOTE ADULT - SUBJECTIVE AND OBJECTIVE BOX
[facility] NEPHROLOGY- PROGRESS NOTE    Patient is a 86y Cantonese speaking Male with HTN on Metoprolol/ Candesartan, HLD, GERD, BPH, vertigo p/w dizziness, frequent falls, chest pain and nausea. Pt found to be bradycardic with elevated serum creatinine and hyponatremia. Nephrology consulted for Elevated serum creatinine/ Hyponatremia.     Hospital Medications: Medications reviewed.  REVIEW OF SYSTEMS:  CONSTITUTIONAL: No fevers or chills; denies dizziness at this time  RESPIRATORY: No shortness of breath  CARDIOVASCULAR: No chest pain.  GASTROINTESTINAL: No nausea, vomiting, diarrhea or abdominal pain.   VASCULAR: No bilateral lower extremity edema.     VITALS:  T(F): 98.1 (20 @ 11:41), Max: 98.1 (20 @ 11:41)  HR: 64 (20 @ 11:41)  BP: 143/54 (20 @ 11:41)  RR: 18 (20 @ 11:41)  SpO2: 98% (20 @ 11:41)  Wt(kg): --     @ 07:01  -   @ 07:00  --------------------------------------------------------  IN: 400 mL / OUT: 600 mL / NET: -200 mL      PHYSICAL EXAM:  Constitutional: NAD  Neurological: Awake and Alert  HEENT: anicteric sclera,   Respiratory: CTA b/l  Cardiovascular: S1, S2, RRR  Gastrointestinal: BS+, soft, NT/ND  Extremities: No peripheral edema      LABS:    133 <--, 133 <--, 132 <--, 131 <--, 130 <--, 127 <--, 120 <--  133<L>  |  101  |  20<H>  ----------------------------<  79  3.9   |  27  |  1.34<H>    Ca    8.4      2020 05:59  Phos  3.0       Mg     1.7         TPro  6.2  /  Alb  2.9<L>  /  TBili  0.9  /  DBili      /  AST  25  /  ALT  28  /  AlkPhos  32<L>      Creatinine Trend: 1.34 <--, 1.37 <--, 1.47 <--, 1.36 <--, 1.54 <--, 1.83 <--, 2.40 <--                        11.5   8.10  )-----------( 170      ( 2020 05:59 )             33.1     Urine Studies:  Urinalysis Basic - ( 2020 22:11 )    Color: Yellow / Appearance: Clear / S.020 / pH:   Gluc:  / Ketone: Negative  / Bili: Negative / Urobili: Negative   Blood:  / Protein: 15 / Nitrite: Negative   Leuk Esterase: Negative / RBC: 2-5 /HPF / WBC 0-2 /HPF   Sq Epi:  / Non Sq Epi: Few /HPF / Bacteria: Trace /HPF      Chloride, Random Urine: <10 mmol/L ( @ 22:11)  Creatinine, Random Urine: 122 mg/dL ( @ 22:11)  Sodium, Random Urine: 12 mmol/L ( @ 22:11)  Osmolality, Random Urine: 372 mos/kg ( @ 22:09)

## 2020-07-17 NOTE — PROGRESS NOTE ADULT - PROBLEM SELECTOR PLAN 2
- Cr 2.4 on admission, improved with fluids.   - Cr stable at 1.30s   - renal US shows no hydronephrosis  - Losartan 50mg QD   - IVF 50cc/hr x 24 hrs  -avoid any nephrotoxic meds and iv contrast  -monitor BMP

## 2020-07-17 NOTE — PROGRESS NOTE ADULT - ATTENDING COMMENTS
[facility] NEPHROLOGY  Mark Anthony Huffman M.D.  Calixto Jeffery D.O.  Bonnie Brown M.D.  Nuria Hollis, MSN, ANP-C  [phone]    [id number] [place] [address]

## 2020-07-18 ENCOUNTER — TRANSCRIPTION ENCOUNTER (OUTPATIENT)
Age: 85
End: 2020-07-18

## 2020-07-18 VITALS
HEART RATE: 58 BPM | TEMPERATURE: 98 F | RESPIRATION RATE: 18 BRPM | DIASTOLIC BLOOD PRESSURE: 44 MMHG | OXYGEN SATURATION: 98 % | SYSTOLIC BLOOD PRESSURE: 117 MMHG

## 2020-07-18 LAB
ALBUMIN SERPL ELPH-MCNC: 3 G/DL — LOW (ref 3.5–5)
ALP SERPL-CCNC: 36 U/L — LOW (ref 40–120)
ALT FLD-CCNC: 31 U/L DA — SIGNIFICANT CHANGE UP (ref 10–60)
ANION GAP SERPL CALC-SCNC: 6 MMOL/L — SIGNIFICANT CHANGE UP (ref 5–17)
AST SERPL-CCNC: 27 U/L — SIGNIFICANT CHANGE UP (ref 10–40)
BASOPHILS # BLD AUTO: 0.02 K/UL — SIGNIFICANT CHANGE UP (ref 0–0.2)
BASOPHILS NFR BLD AUTO: 0.2 % — SIGNIFICANT CHANGE UP (ref 0–2)
BILIRUB SERPL-MCNC: 0.9 MG/DL — SIGNIFICANT CHANGE UP (ref 0.2–1.2)
BUN SERPL-MCNC: 17 MG/DL — SIGNIFICANT CHANGE UP (ref 7–18)
CALCIUM SERPL-MCNC: 8.7 MG/DL — SIGNIFICANT CHANGE UP (ref 8.4–10.5)
CHLORIDE SERPL-SCNC: 97 MMOL/L — SIGNIFICANT CHANGE UP (ref 96–108)
CO2 SERPL-SCNC: 27 MMOL/L — SIGNIFICANT CHANGE UP (ref 22–31)
CREAT SERPL-MCNC: 1.1 MG/DL — SIGNIFICANT CHANGE UP (ref 0.5–1.3)
EOSINOPHIL # BLD AUTO: 0.14 K/UL — SIGNIFICANT CHANGE UP (ref 0–0.5)
EOSINOPHIL NFR BLD AUTO: 1.5 % — SIGNIFICANT CHANGE UP (ref 0–6)
GLUCOSE SERPL-MCNC: 86 MG/DL — SIGNIFICANT CHANGE UP (ref 70–99)
HCT VFR BLD CALC: 33.7 % — LOW (ref 39–50)
HGB BLD-MCNC: 11.9 G/DL — LOW (ref 13–17)
IMM GRANULOCYTES NFR BLD AUTO: 0.5 % — SIGNIFICANT CHANGE UP (ref 0–1.5)
LYMPHOCYTES # BLD AUTO: 1.43 K/UL — SIGNIFICANT CHANGE UP (ref 1–3.3)
LYMPHOCYTES # BLD AUTO: 15.1 % — SIGNIFICANT CHANGE UP (ref 13–44)
MAGNESIUM SERPL-MCNC: 1.8 MG/DL — SIGNIFICANT CHANGE UP (ref 1.6–2.6)
MCHC RBC-ENTMCNC: 30.6 PG — SIGNIFICANT CHANGE UP (ref 27–34)
MCHC RBC-ENTMCNC: 35.3 GM/DL — SIGNIFICANT CHANGE UP (ref 32–36)
MCV RBC AUTO: 86.6 FL — SIGNIFICANT CHANGE UP (ref 80–100)
MONOCYTES # BLD AUTO: 0.73 K/UL — SIGNIFICANT CHANGE UP (ref 0–0.9)
MONOCYTES NFR BLD AUTO: 7.7 % — SIGNIFICANT CHANGE UP (ref 2–14)
NEUTROPHILS # BLD AUTO: 7.09 K/UL — SIGNIFICANT CHANGE UP (ref 1.8–7.4)
NEUTROPHILS NFR BLD AUTO: 75 % — SIGNIFICANT CHANGE UP (ref 43–77)
NRBC # BLD: 0 /100 WBCS — SIGNIFICANT CHANGE UP (ref 0–0)
PHOSPHATE SERPL-MCNC: 2.8 MG/DL — SIGNIFICANT CHANGE UP (ref 2.5–4.5)
PLATELET # BLD AUTO: 182 K/UL — SIGNIFICANT CHANGE UP (ref 150–400)
POTASSIUM SERPL-MCNC: 3.8 MMOL/L — SIGNIFICANT CHANGE UP (ref 3.5–5.3)
POTASSIUM SERPL-SCNC: 3.8 MMOL/L — SIGNIFICANT CHANGE UP (ref 3.5–5.3)
PROT SERPL-MCNC: 6.6 G/DL — SIGNIFICANT CHANGE UP (ref 6–8.3)
RBC # BLD: 3.89 M/UL — LOW (ref 4.2–5.8)
RBC # FLD: 13 % — SIGNIFICANT CHANGE UP (ref 10.3–14.5)
SODIUM SERPL-SCNC: 130 MMOL/L — LOW (ref 135–145)
WBC # BLD: 9.46 K/UL — SIGNIFICANT CHANGE UP (ref 3.8–10.5)
WBC # FLD AUTO: 9.46 K/UL — SIGNIFICANT CHANGE UP (ref 3.8–10.5)

## 2020-07-18 PROCEDURE — 71046 X-RAY EXAM CHEST 2 VIEWS: CPT

## 2020-07-18 PROCEDURE — 86769 SARS-COV-2 COVID-19 ANTIBODY: CPT

## 2020-07-18 PROCEDURE — 82550 ASSAY OF CK (CPK): CPT

## 2020-07-18 PROCEDURE — 83550 IRON BINDING TEST: CPT

## 2020-07-18 PROCEDURE — 93306 TTE W/DOPPLER COMPLETE: CPT

## 2020-07-18 PROCEDURE — 84439 ASSAY OF FREE THYROXINE: CPT

## 2020-07-18 PROCEDURE — 84156 ASSAY OF PROTEIN URINE: CPT

## 2020-07-18 PROCEDURE — 83935 ASSAY OF URINE OSMOLALITY: CPT

## 2020-07-18 PROCEDURE — 83735 ASSAY OF MAGNESIUM: CPT

## 2020-07-18 PROCEDURE — 84540 ASSAY OF URINE/UREA-N: CPT

## 2020-07-18 PROCEDURE — 84443 ASSAY THYROID STIM HORMONE: CPT

## 2020-07-18 PROCEDURE — 99239 HOSP IP/OBS DSCHRG MGMT >30: CPT | Mod: GC

## 2020-07-18 PROCEDURE — 82607 VITAMIN B-12: CPT

## 2020-07-18 PROCEDURE — U0003: CPT

## 2020-07-18 PROCEDURE — 84436 ASSAY OF TOTAL THYROXINE: CPT

## 2020-07-18 PROCEDURE — 84300 ASSAY OF URINE SODIUM: CPT

## 2020-07-18 PROCEDURE — 83540 ASSAY OF IRON: CPT

## 2020-07-18 PROCEDURE — 82746 ASSAY OF FOLIC ACID SERUM: CPT

## 2020-07-18 PROCEDURE — 83930 ASSAY OF BLOOD OSMOLALITY: CPT

## 2020-07-18 PROCEDURE — 76775 US EXAM ABDO BACK WALL LIM: CPT

## 2020-07-18 PROCEDURE — 80048 BASIC METABOLIC PNL TOTAL CA: CPT

## 2020-07-18 PROCEDURE — 80061 LIPID PANEL: CPT

## 2020-07-18 PROCEDURE — 84484 ASSAY OF TROPONIN QUANT: CPT

## 2020-07-18 PROCEDURE — 82962 GLUCOSE BLOOD TEST: CPT

## 2020-07-18 PROCEDURE — 82570 ASSAY OF URINE CREATININE: CPT

## 2020-07-18 PROCEDURE — 84100 ASSAY OF PHOSPHORUS: CPT

## 2020-07-18 PROCEDURE — 97161 PT EVAL LOW COMPLEX 20 MIN: CPT

## 2020-07-18 PROCEDURE — 93880 EXTRACRANIAL BILAT STUDY: CPT

## 2020-07-18 PROCEDURE — 82436 ASSAY OF URINE CHLORIDE: CPT

## 2020-07-18 PROCEDURE — 70450 CT HEAD/BRAIN W/O DYE: CPT

## 2020-07-18 PROCEDURE — 82728 ASSAY OF FERRITIN: CPT

## 2020-07-18 PROCEDURE — 99285 EMERGENCY DEPT VISIT HI MDM: CPT | Mod: 25

## 2020-07-18 PROCEDURE — 81001 URINALYSIS AUTO W/SCOPE: CPT

## 2020-07-18 PROCEDURE — 80053 COMPREHEN METABOLIC PANEL: CPT

## 2020-07-18 PROCEDURE — 85027 COMPLETE CBC AUTOMATED: CPT

## 2020-07-18 PROCEDURE — 83036 HEMOGLOBIN GLYCOSYLATED A1C: CPT

## 2020-07-18 PROCEDURE — 93005 ELECTROCARDIOGRAM TRACING: CPT

## 2020-07-18 PROCEDURE — 36415 COLL VENOUS BLD VENIPUNCTURE: CPT

## 2020-07-18 RX ORDER — NIFEDIPINE 30 MG
30 TABLET, EXTENDED RELEASE 24 HR ORAL DAILY
Refills: 0 | Status: DISCONTINUED | OUTPATIENT
Start: 2020-07-19 | End: 2020-07-18

## 2020-07-18 RX ADMIN — Medication 25 MILLIGRAM(S): at 05:31

## 2020-07-18 RX ADMIN — HEPARIN SODIUM 5000 UNIT(S): 5000 INJECTION INTRAVENOUS; SUBCUTANEOUS at 05:32

## 2020-07-18 RX ADMIN — Medication 60 MILLIGRAM(S): at 06:05

## 2020-07-18 RX ADMIN — LOSARTAN POTASSIUM 25 MILLIGRAM(S): 100 TABLET, FILM COATED ORAL at 05:31

## 2020-07-18 RX ADMIN — Medication 81 MILLIGRAM(S): at 11:55

## 2020-07-18 RX ADMIN — PANTOPRAZOLE SODIUM 40 MILLIGRAM(S): 20 TABLET, DELAYED RELEASE ORAL at 05:31

## 2020-07-18 NOTE — DISCHARGE NOTE NURSING/CASE MANAGEMENT/SOCIAL WORK - PATIENT PORTAL LINK FT
You can access the FollowMyHealth Patient Portal offered by [facility] by registering at the following website: [email or web address]. By joining [employer]’s FollowMyHealth portal, you will also be able to view your health information using other applications (apps) compatible with our system.

## 2020-07-19 NOTE — CHART NOTE - NSCHARTNOTEFT_GEN_A_CORE
Patient seen and examined earlier this morning and  Discussed with Dr. Ordaz.    83 y/o male, from home, Cantonese speaking primarily but can speak and understand  English,  with PMH of HTN on Metoprolol, BPH, GERD, chronic vertigo who presented with persistent dizziness for the past 2 weeks with intermittent chest discomfort. In the ED pt found to be  bradycardic.  EKG showed  sinus bradycardia in the 40s. Sodium level 120.    Pt admitted to telemetry with symptomatic sinus bradycardia, hyponatremia and MARTITA all of which has resolved.    Patient feels much better today without any dizziness or vertiginous symptoms. BP well controlled. No new complaints  Vital Signs Last 24 Hrs  T(C): 36.8 (18 Jul 2020 11:14), Max: 36.9 (18 Jul 2020 07:30)  T(F): 98.2 (18 Jul 2020 11:14), Max: 98.5 (18 Jul 2020 07:30)  HR: 58 (18 Jul 2020 11:14) (58 - 65)  BP: 117/44 (18 Jul 2020 11:14) (117/44 - 125/47)  RR: 18 (18 Jul 2020 11:14) (18 - 18)  SpO2: 98% (18 Jul 2020 11:14) (97% - 98%)    P/E:  elderly male, not in acute distress, eating well, ambulating independently  Neuro: AAO x3; No focal deficits;   CVS: S1S2 present, regular  Resp: BLAE+, No wheeze or Rhonchi  GI: Soft, BS+, NT  Extr: No edema or calf tenderness    Labs:                          11.9   9.46  )-----------( 182      ( 18 Jul 2020 07:07 )             33.7   07-18    130<L>  |  97  |  17  ----------------------------<  86  3.8   |  27  |  1.10    Ca    8.7      18 Jul 2020 07:07  Phos  2.8     07-18  Mg     1.8     07-18    TPro  6.6  /  Alb  3.0<L>  /  TBili  0.9  /  DBili  x   /  AST  27  /  ALT  31  /  AlkPhos  36<L>  07-18      D/D:  Uncontrolled HTN partly related to Salt tabs well controlled  Dizziness likely due to volume depletion due to dehydration resolved  Possible BPPV  MARTITA due to Prerenal azotemia resolved  Hypovolemic hyponatremia resolved  Suspected Symptomatic Sinus Bradycardia likely due to beta blocker, improved and stable   Hx BPH and Gout    Plan:  Continue Metoprolol ER 25 mg daily; On discharge reduce Mifedipine to 30 mg daily given age and BP towards lower end of normal  Candesartan will advice 8 mg daily, half dose athome due to renal function which has now normalized  Monitor renal function closely; If renal function worsens, will hold ARB; has been stable past 4 days with ARB  Echo with PFO vs ASD; d/w Cardio; no intervention indicated as patient does not have any evidence of ischemic stroke.   Patient may be discharged home to f/u with PCP for blood work to monitor renal fn and Na  Also recommended follow up with Neurootologist Dr. Ganesh Mayer    Discussed with patient   Discussed with PGY1 Dr. Ordaz and RN    See discharge note updated

## 2020-07-25 ENCOUNTER — TRANSCRIPTION ENCOUNTER (OUTPATIENT)
Age: 85
End: 2020-07-25

## 2020-07-25 ENCOUNTER — INPATIENT (INPATIENT)
Facility: HOSPITAL | Age: 85
LOS: 3 days | Discharge: HOME | DRG: 312 | End: 2020-07-29
Attending: INTERNAL MEDICINE | Admitting: INTERNAL MEDICINE
Payer: COMMERCIAL

## 2020-07-25 VITALS
HEIGHT: 60 IN | OXYGEN SATURATION: 96 % | DIASTOLIC BLOOD PRESSURE: 66 MMHG | HEART RATE: 68 BPM | RESPIRATION RATE: 16 BRPM | TEMPERATURE: 98 F | SYSTOLIC BLOOD PRESSURE: 140 MMHG | WEIGHT: 110.23 LBS

## 2020-07-25 DIAGNOSIS — Z29.9 ENCOUNTER FOR PROPHYLACTIC MEASURES, UNSPECIFIED: ICD-10-CM

## 2020-07-25 DIAGNOSIS — N40.0 BENIGN PROSTATIC HYPERPLASIA WITHOUT LOWER URINARY TRACT SYMPTOMS: ICD-10-CM

## 2020-07-25 DIAGNOSIS — I10 ESSENTIAL (PRIMARY) HYPERTENSION: ICD-10-CM

## 2020-07-25 DIAGNOSIS — R42 DIZZINESS AND GIDDINESS: ICD-10-CM

## 2020-07-25 DIAGNOSIS — N17.9 ACUTE KIDNEY FAILURE, UNSPECIFIED: ICD-10-CM

## 2020-07-25 DIAGNOSIS — M10.9 GOUT, UNSPECIFIED: ICD-10-CM

## 2020-07-25 DIAGNOSIS — E87.1 HYPO-OSMOLALITY AND HYPONATREMIA: ICD-10-CM

## 2020-07-25 DIAGNOSIS — K21.9 GASTRO-ESOPHAGEAL REFLUX DISEASE WITHOUT ESOPHAGITIS: ICD-10-CM

## 2020-07-25 LAB
24R-OH-CALCIDIOL SERPL-MCNC: 20.2 NG/ML — LOW (ref 30–80)
A1C WITH ESTIMATED AVERAGE GLUCOSE RESULT: 5.5 % — SIGNIFICANT CHANGE UP (ref 4–5.6)
ALBUMIN SERPL ELPH-MCNC: 3.2 G/DL — LOW (ref 3.5–5)
ALBUMIN SERPL ELPH-MCNC: 3.4 G/DL — LOW (ref 3.5–5)
ALP SERPL-CCNC: 39 U/L — LOW (ref 40–120)
ALP SERPL-CCNC: 49 U/L — SIGNIFICANT CHANGE UP (ref 40–120)
ALT FLD-CCNC: 32 U/L DA — SIGNIFICANT CHANGE UP (ref 10–60)
ALT FLD-CCNC: 33 U/L DA — SIGNIFICANT CHANGE UP (ref 10–60)
ANION GAP SERPL CALC-SCNC: 6 MMOL/L — SIGNIFICANT CHANGE UP (ref 5–17)
ANION GAP SERPL CALC-SCNC: 7 MMOL/L — SIGNIFICANT CHANGE UP (ref 5–17)
APPEARANCE UR: CLEAR — SIGNIFICANT CHANGE UP
AST SERPL-CCNC: 20 U/L — SIGNIFICANT CHANGE UP (ref 10–40)
AST SERPL-CCNC: 25 U/L — SIGNIFICANT CHANGE UP (ref 10–40)
BASOPHILS # BLD AUTO: 0.01 K/UL — SIGNIFICANT CHANGE UP (ref 0–0.2)
BASOPHILS # BLD AUTO: 0.02 K/UL — SIGNIFICANT CHANGE UP (ref 0–0.2)
BASOPHILS NFR BLD AUTO: 0.2 % — SIGNIFICANT CHANGE UP (ref 0–2)
BASOPHILS NFR BLD AUTO: 0.4 % — SIGNIFICANT CHANGE UP (ref 0–2)
BILIRUB SERPL-MCNC: 0.5 MG/DL — SIGNIFICANT CHANGE UP (ref 0.2–1.2)
BILIRUB SERPL-MCNC: 0.5 MG/DL — SIGNIFICANT CHANGE UP (ref 0.2–1.2)
BILIRUB UR-MCNC: NEGATIVE — SIGNIFICANT CHANGE UP
BUN SERPL-MCNC: 17 MG/DL — SIGNIFICANT CHANGE UP (ref 7–18)
BUN SERPL-MCNC: 25 MG/DL — HIGH (ref 7–18)
CALCIUM SERPL-MCNC: 8.2 MG/DL — LOW (ref 8.4–10.5)
CALCIUM SERPL-MCNC: 8.3 MG/DL — LOW (ref 8.4–10.5)
CHLORIDE SERPL-SCNC: 104 MMOL/L — SIGNIFICANT CHANGE UP (ref 96–108)
CHLORIDE SERPL-SCNC: 96 MMOL/L — SIGNIFICANT CHANGE UP (ref 96–108)
CHOLEST SERPL-MCNC: 148 MG/DL — SIGNIFICANT CHANGE UP (ref 10–199)
CO2 SERPL-SCNC: 26 MMOL/L — SIGNIFICANT CHANGE UP (ref 22–31)
CO2 SERPL-SCNC: 27 MMOL/L — SIGNIFICANT CHANGE UP (ref 22–31)
COLOR SPEC: YELLOW — SIGNIFICANT CHANGE UP
CREAT ?TM UR-MCNC: <13 MG/DL — SIGNIFICANT CHANGE UP
CREAT SERPL-MCNC: 1.15 MG/DL — SIGNIFICANT CHANGE UP (ref 0.5–1.3)
CREAT SERPL-MCNC: 1.43 MG/DL — HIGH (ref 0.5–1.3)
DIFF PNL FLD: NEGATIVE — SIGNIFICANT CHANGE UP
EOSINOPHIL # BLD AUTO: 0.03 K/UL — SIGNIFICANT CHANGE UP (ref 0–0.5)
EOSINOPHIL # BLD AUTO: 0.15 K/UL — SIGNIFICANT CHANGE UP (ref 0–0.5)
EOSINOPHIL NFR BLD AUTO: 0.5 % — SIGNIFICANT CHANGE UP (ref 0–6)
EOSINOPHIL NFR BLD AUTO: 2.6 % — SIGNIFICANT CHANGE UP (ref 0–6)
ESTIMATED AVERAGE GLUCOSE: 111 MG/DL — SIGNIFICANT CHANGE UP (ref 68–114)
FOLATE SERPL-MCNC: 15.7 NG/ML — SIGNIFICANT CHANGE UP
GLUCOSE SERPL-MCNC: 83 MG/DL — SIGNIFICANT CHANGE UP (ref 70–99)
GLUCOSE SERPL-MCNC: 85 MG/DL — SIGNIFICANT CHANGE UP (ref 70–99)
GLUCOSE UR QL: NEGATIVE — SIGNIFICANT CHANGE UP
HCT VFR BLD CALC: 30.2 % — LOW (ref 39–50)
HCT VFR BLD CALC: 37.9 % — LOW (ref 39–50)
HDLC SERPL-MCNC: 84 MG/DL — SIGNIFICANT CHANGE UP
HGB BLD-MCNC: 10.5 G/DL — LOW (ref 13–17)
HGB BLD-MCNC: 12.7 G/DL — LOW (ref 13–17)
IMM GRANULOCYTES NFR BLD AUTO: 0.2 % — SIGNIFICANT CHANGE UP (ref 0–1.5)
IMM GRANULOCYTES NFR BLD AUTO: 0.3 % — SIGNIFICANT CHANGE UP (ref 0–1.5)
KETONES UR-MCNC: NEGATIVE — SIGNIFICANT CHANGE UP
LEUKOCYTE ESTERASE UR-ACNC: NEGATIVE — SIGNIFICANT CHANGE UP
LIPID PNL WITH DIRECT LDL SERPL: 55 MG/DL — SIGNIFICANT CHANGE UP
LYMPHOCYTES # BLD AUTO: 0.82 K/UL — LOW (ref 1–3.3)
LYMPHOCYTES # BLD AUTO: 1.67 K/UL — SIGNIFICANT CHANGE UP (ref 1–3.3)
LYMPHOCYTES # BLD AUTO: 15 % — SIGNIFICANT CHANGE UP (ref 13–44)
LYMPHOCYTES # BLD AUTO: 28.4 % — SIGNIFICANT CHANGE UP (ref 13–44)
MAGNESIUM SERPL-MCNC: 2 MG/DL — SIGNIFICANT CHANGE UP (ref 1.6–2.6)
MAGNESIUM SERPL-MCNC: 2.2 MG/DL — SIGNIFICANT CHANGE UP (ref 1.6–2.6)
MCHC RBC-ENTMCNC: 30.4 PG — SIGNIFICANT CHANGE UP (ref 27–34)
MCHC RBC-ENTMCNC: 30.5 PG — SIGNIFICANT CHANGE UP (ref 27–34)
MCHC RBC-ENTMCNC: 33.5 GM/DL — SIGNIFICANT CHANGE UP (ref 32–36)
MCHC RBC-ENTMCNC: 34.8 GM/DL — SIGNIFICANT CHANGE UP (ref 32–36)
MCV RBC AUTO: 87.8 FL — SIGNIFICANT CHANGE UP (ref 80–100)
MCV RBC AUTO: 90.7 FL — SIGNIFICANT CHANGE UP (ref 80–100)
MONOCYTES # BLD AUTO: 0.33 K/UL — SIGNIFICANT CHANGE UP (ref 0–0.9)
MONOCYTES # BLD AUTO: 0.69 K/UL — SIGNIFICANT CHANGE UP (ref 0–0.9)
MONOCYTES NFR BLD AUTO: 11.8 % — SIGNIFICANT CHANGE UP (ref 2–14)
MONOCYTES NFR BLD AUTO: 6 % — SIGNIFICANT CHANGE UP (ref 2–14)
NEUTROPHILS # BLD AUTO: 3.33 K/UL — SIGNIFICANT CHANGE UP (ref 1.8–7.4)
NEUTROPHILS # BLD AUTO: 4.25 K/UL — SIGNIFICANT CHANGE UP (ref 1.8–7.4)
NEUTROPHILS NFR BLD AUTO: 56.7 % — SIGNIFICANT CHANGE UP (ref 43–77)
NEUTROPHILS NFR BLD AUTO: 77.9 % — HIGH (ref 43–77)
NITRITE UR-MCNC: NEGATIVE — SIGNIFICANT CHANGE UP
NRBC # BLD: 0 /100 WBCS — SIGNIFICANT CHANGE UP (ref 0–0)
NRBC # BLD: 0 /100 WBCS — SIGNIFICANT CHANGE UP (ref 0–0)
OSMOLALITY UR: 154 MOS/KG — SIGNIFICANT CHANGE UP (ref 50–1200)
PH UR: 7 — SIGNIFICANT CHANGE UP (ref 5–8)
PHOSPHATE SERPL-MCNC: 2.4 MG/DL — LOW (ref 2.5–4.5)
PLATELET # BLD AUTO: 174 K/UL — SIGNIFICANT CHANGE UP (ref 150–400)
PLATELET # BLD AUTO: 196 K/UL — SIGNIFICANT CHANGE UP (ref 150–400)
POTASSIUM SERPL-MCNC: 3.8 MMOL/L — SIGNIFICANT CHANGE UP (ref 3.5–5.3)
POTASSIUM SERPL-MCNC: 4.2 MMOL/L — SIGNIFICANT CHANGE UP (ref 3.5–5.3)
POTASSIUM SERPL-SCNC: 3.8 MMOL/L — SIGNIFICANT CHANGE UP (ref 3.5–5.3)
POTASSIUM SERPL-SCNC: 4.2 MMOL/L — SIGNIFICANT CHANGE UP (ref 3.5–5.3)
PROT SERPL-MCNC: 6.5 G/DL — SIGNIFICANT CHANGE UP (ref 6–8.3)
PROT SERPL-MCNC: 7.4 G/DL — SIGNIFICANT CHANGE UP (ref 6–8.3)
PROT UR-MCNC: NEGATIVE — SIGNIFICANT CHANGE UP
RBC # BLD: 3.44 M/UL — LOW (ref 4.2–5.8)
RBC # BLD: 4.18 M/UL — LOW (ref 4.2–5.8)
RBC # FLD: 13.3 % — SIGNIFICANT CHANGE UP (ref 10.3–14.5)
RBC # FLD: 13.3 % — SIGNIFICANT CHANGE UP (ref 10.3–14.5)
SARS-COV-2 IGG SERPL QL IA: NEGATIVE — SIGNIFICANT CHANGE UP
SARS-COV-2 IGM SERPL IA-ACNC: 0.09 INDEX — SIGNIFICANT CHANGE UP
SARS-COV-2 RNA SPEC QL NAA+PROBE: SIGNIFICANT CHANGE UP
SODIUM SERPL-SCNC: 130 MMOL/L — LOW (ref 135–145)
SODIUM SERPL-SCNC: 136 MMOL/L — SIGNIFICANT CHANGE UP (ref 135–145)
SODIUM UR-SCNC: 57 MMOL/L — SIGNIFICANT CHANGE UP
SP GR SPEC: 1 — LOW (ref 1.01–1.02)
TOTAL CHOLESTEROL/HDL RATIO MEASUREMENT: 1.8 RATIO — LOW (ref 3.4–9.6)
TRIGL SERPL-MCNC: 44 MG/DL — SIGNIFICANT CHANGE UP (ref 10–149)
TROPONIN I SERPL-MCNC: <0.015 NG/ML — SIGNIFICANT CHANGE UP (ref 0–0.04)
TSH SERPL-MCNC: 0.68 UU/ML — SIGNIFICANT CHANGE UP (ref 0.34–4.82)
UROBILINOGEN FLD QL: NEGATIVE — SIGNIFICANT CHANGE UP
VIT B12 SERPL-MCNC: 975 PG/ML — SIGNIFICANT CHANGE UP (ref 232–1245)
WBC # BLD: 5.46 K/UL — SIGNIFICANT CHANGE UP (ref 3.8–10.5)
WBC # BLD: 5.87 K/UL — SIGNIFICANT CHANGE UP (ref 3.8–10.5)
WBC # FLD AUTO: 5.46 K/UL — SIGNIFICANT CHANGE UP (ref 3.8–10.5)
WBC # FLD AUTO: 5.87 K/UL — SIGNIFICANT CHANGE UP (ref 3.8–10.5)

## 2020-07-25 PROCEDURE — 99285 EMERGENCY DEPT VISIT HI MDM: CPT

## 2020-07-25 PROCEDURE — 99222 1ST HOSP IP/OBS MODERATE 55: CPT

## 2020-07-25 PROCEDURE — 99223 1ST HOSP IP/OBS HIGH 75: CPT

## 2020-07-25 RX ORDER — MECLIZINE HCL 12.5 MG
25 TABLET ORAL
Refills: 0 | Status: DISCONTINUED | OUTPATIENT
Start: 2020-07-25 | End: 2020-07-26

## 2020-07-25 RX ORDER — METOPROLOL TARTRATE 50 MG
12.5 TABLET ORAL DAILY
Refills: 0 | Status: DISCONTINUED | OUTPATIENT
Start: 2020-07-25 | End: 2020-07-29

## 2020-07-25 RX ORDER — PANTOPRAZOLE SODIUM 20 MG/1
40 TABLET, DELAYED RELEASE ORAL
Refills: 0 | Status: DISCONTINUED | OUTPATIENT
Start: 2020-07-25 | End: 2020-07-29

## 2020-07-25 RX ORDER — TAMSULOSIN HYDROCHLORIDE 0.4 MG/1
0.4 CAPSULE ORAL AT BEDTIME
Refills: 0 | Status: DISCONTINUED | OUTPATIENT
Start: 2020-07-25 | End: 2020-07-29

## 2020-07-25 RX ORDER — SODIUM CHLORIDE 9 MG/ML
1 INJECTION INTRAMUSCULAR; INTRAVENOUS; SUBCUTANEOUS THREE TIMES A DAY
Refills: 0 | Status: DISCONTINUED | OUTPATIENT
Start: 2020-07-25 | End: 2020-07-29

## 2020-07-25 RX ORDER — HEPARIN SODIUM 5000 [USP'U]/ML
5000 INJECTION INTRAVENOUS; SUBCUTANEOUS EVERY 12 HOURS
Refills: 0 | Status: DISCONTINUED | OUTPATIENT
Start: 2020-07-25 | End: 2020-07-29

## 2020-07-25 RX ORDER — SODIUM CHLORIDE 9 MG/ML
1000 INJECTION INTRAMUSCULAR; INTRAVENOUS; SUBCUTANEOUS
Refills: 0 | Status: DISCONTINUED | OUTPATIENT
Start: 2020-07-25 | End: 2020-07-28

## 2020-07-25 RX ORDER — ASPIRIN/CALCIUM CARB/MAGNESIUM 324 MG
81 TABLET ORAL DAILY
Refills: 0 | Status: DISCONTINUED | OUTPATIENT
Start: 2020-07-25 | End: 2020-07-29

## 2020-07-25 RX ORDER — MECLIZINE HCL 12.5 MG
50 TABLET ORAL ONCE
Refills: 0 | Status: COMPLETED | OUTPATIENT
Start: 2020-07-25 | End: 2020-07-25

## 2020-07-25 RX ORDER — SODIUM CHLORIDE 9 MG/ML
500 INJECTION INTRAMUSCULAR; INTRAVENOUS; SUBCUTANEOUS ONCE
Refills: 0 | Status: COMPLETED | OUTPATIENT
Start: 2020-07-25 | End: 2020-07-25

## 2020-07-25 RX ORDER — ATORVASTATIN CALCIUM 80 MG/1
10 TABLET, FILM COATED ORAL AT BEDTIME
Refills: 0 | Status: DISCONTINUED | OUTPATIENT
Start: 2020-07-25 | End: 2020-07-29

## 2020-07-25 RX ORDER — NIFEDIPINE 30 MG
30 TABLET, EXTENDED RELEASE 24 HR ORAL DAILY
Refills: 0 | Status: DISCONTINUED | OUTPATIENT
Start: 2020-07-25 | End: 2020-07-29

## 2020-07-25 RX ORDER — SODIUM CHLORIDE 9 MG/ML
1000 INJECTION INTRAMUSCULAR; INTRAVENOUS; SUBCUTANEOUS ONCE
Refills: 0 | Status: COMPLETED | OUTPATIENT
Start: 2020-07-25 | End: 2020-07-25

## 2020-07-25 RX ADMIN — SODIUM CHLORIDE 65 MILLILITER(S): 9 INJECTION INTRAMUSCULAR; INTRAVENOUS; SUBCUTANEOUS at 06:42

## 2020-07-25 RX ADMIN — HEPARIN SODIUM 5000 UNIT(S): 5000 INJECTION INTRAVENOUS; SUBCUTANEOUS at 17:42

## 2020-07-25 RX ADMIN — Medication 25 MILLIGRAM(S): at 13:38

## 2020-07-25 RX ADMIN — SODIUM CHLORIDE 1000 MILLILITER(S): 9 INJECTION INTRAMUSCULAR; INTRAVENOUS; SUBCUTANEOUS at 02:45

## 2020-07-25 RX ADMIN — PANTOPRAZOLE SODIUM 40 MILLIGRAM(S): 20 TABLET, DELAYED RELEASE ORAL at 13:38

## 2020-07-25 RX ADMIN — Medication 25 MILLIGRAM(S): at 22:17

## 2020-07-25 RX ADMIN — SODIUM CHLORIDE 1 GRAM(S): 9 INJECTION INTRAMUSCULAR; INTRAVENOUS; SUBCUTANEOUS at 22:17

## 2020-07-25 RX ADMIN — Medication 30 MILLIGRAM(S): at 13:38

## 2020-07-25 RX ADMIN — SODIUM CHLORIDE 1000 MILLILITER(S): 9 INJECTION INTRAMUSCULAR; INTRAVENOUS; SUBCUTANEOUS at 08:00

## 2020-07-25 RX ADMIN — SODIUM CHLORIDE 1 GRAM(S): 9 INJECTION INTRAMUSCULAR; INTRAVENOUS; SUBCUTANEOUS at 13:38

## 2020-07-25 RX ADMIN — SODIUM CHLORIDE 1000 MILLILITER(S): 9 INJECTION INTRAMUSCULAR; INTRAVENOUS; SUBCUTANEOUS at 01:29

## 2020-07-25 RX ADMIN — Medication 12.5 MILLIGRAM(S): at 13:38

## 2020-07-25 RX ADMIN — ATORVASTATIN CALCIUM 10 MILLIGRAM(S): 80 TABLET, FILM COATED ORAL at 22:16

## 2020-07-25 RX ADMIN — Medication 81 MILLIGRAM(S): at 13:38

## 2020-07-25 RX ADMIN — TAMSULOSIN HYDROCHLORIDE 0.4 MILLIGRAM(S): 0.4 CAPSULE ORAL at 22:17

## 2020-07-25 RX ADMIN — Medication 50 MILLIGRAM(S): at 01:26

## 2020-07-25 RX ADMIN — SODIUM CHLORIDE 65 MILLILITER(S): 9 INJECTION INTRAMUSCULAR; INTRAVENOUS; SUBCUTANEOUS at 22:17

## 2020-07-25 NOTE — H&P ADULT - PROBLEM SELECTOR PLAN 8
RISK                                                          Points  [] Previous VTE                                           3  [] Thrombophilia                                        2  [] Lower limb paralysis                              2   [] Current Cancer                                       2   [x] Immobilization > 24 hrs                        1  [] ICU/CCU stay > 24 hours                       1  [x] Age > 60                                                   1    sc heparin

## 2020-07-25 NOTE — H&P ADULT - HISTORY OF PRESENT ILLNESS
86 year old [place] [place]  from home lives with wife ,walks independently came with hx of HTN, HLD, GERD, BPH, vertigo presents with c/o severe dizziness lasting whole day today, pt was feeling better after getting discharged from this hospital last time but vertigo was never completly gone.  pT denies any  acute visual changes, phonophobia, aura, headache, no loss of vision or hearing, no ringing in ears, no fullness in ears. no fever, no abd pain, no dysuria. Pt says no covid exposure, no syncope, no focal weakness, no trouble swallowing. Pt never followed up with outpt neurologist recommended on discharge. 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 Pt ahs no changes in his sensation in extremities.

## 2020-07-25 NOTE — H&P ADULT - NSHPPHYSICALEXAM_GEN_ALL_CORE
Vital Signs Last 24 Hrs  T(C): 36.7 (25 Jul 2020 00:31), Max: 36.7 (25 Jul 2020 00:31)  T(F): 98.1 (25 Jul 2020 00:31), Max: 98.1 (25 Jul 2020 00:31)  HR: 68 (25 Jul 2020 00:31) (68 - 68)  BP: 140/66 (25 Jul 2020 00:31) (140/66 - 140/66)  BP(mean): --  RR: 16 (25 Jul 2020 00:31) (16 - 16)  SpO2: 96% (25 Jul 2020 00:31) (96% - 96%)

## 2020-07-25 NOTE — H&P ADULT - PROBLEM SELECTOR PLAN 3
pt came with cr 1.43,   pt has hx of low oral intake usually  -bladder scan neg  -will start mild hydration  -f/u bmp  renal scan last week wnl  F/U URINE LYTES  -on losartan will hold in setting of kristofer  -avoid any nephrotoxic meds and iv contrast  gfr 24 if ckd its stage 4 that's indication for nephrologist followup.

## 2020-07-25 NOTE — H&P ADULT - NSHPSOCIALHISTORY_GEN_ALL_CORE
Forms were given to provider at visit, not completed at this time.   Glendy Kat RN  09/13/19  10:14 AM      
[facility] Call Center    Phone Message    May a detailed message be left on voicemail: yes    Reason for Call: Other: PT is following up to see if Dr. Acharya faxed over forms for economic assistance for the PT?  She could not provide a fax number when I asked.  Please follow up with the status.     Action Taken: Message routed to:  [facility]: care home  
NO SMOKING   LIVES WITH WIFE  ADOPTED SO NO INFORMATION ON FAMILY HX

## 2020-07-25 NOTE — CONSULT NOTE ADULT - ATTENDING COMMENTS
I counseled the patient about his likely diagnosis of syncope, and the steps to take to help manage and prevent similar syncopal episodes.

## 2020-07-25 NOTE — ED ADULT NURSE NOTE - CHPI ED NUR SYMPTOMS NEG
no vomiting/no shortness of breath/no chest pain/no chills/no diaphoresis/no fever/no congestion/no nausea/no back pain

## 2020-07-25 NOTE — H&P ADULT - PROBLEM SELECTOR PLAN 2
pt has chronic hyponatremia  donot use salt in diet  will start on salt tabs   f/u bmp  f/u urine lytes  -f/u Fena  -started pt on regular diet

## 2020-07-25 NOTE — ED ADULT NURSE NOTE - PRO INTERPRETER NEED 2
[FreeTextEntry1] : Dzziness, possibly secondary to bilateral cerumen impaction, rule out neurological disorders
English

## 2020-07-25 NOTE — H&P ADULT - ATTENDING COMMENTS
Patient seen and examined ; case was discussed with the admitting resident    ROS: as in the HPI; all other ROS negative    SH and family history as above    Vital Signs Last 24 Hrs  T(C): 36.7 (2020 00:31), Max: 36.7 (2020 00:31)  T(F): 98.1 (2020 00:31), Max: 98.1 (2020 00:31)  HR: 68 (2020 00:31) (68 - 68)  BP: 140/66 (2020 00:31) (140/66 - 140/66)  BP(mean): --  RR: 16 (2020 00:31) (16 - 16)  SpO2: 96% (2020 00:31) (96% - 96%)    GEN: NAD  HEENT- normocephalic; mouth moist, Nystagmus with rightward gaze spontaneously terminates after 2 beats   CVS- S1S2+  LUNGS- clear to auscultation; no wheezing  ABD: Soft , nontender, nondistended, Bowel sounds are present  EXTREMITY: no calf tenderness, no cyanosis, no edema  NEURO: AAOx3; non focal neurologic exam; cranial nerves grossly intact  PSYCH: normal affect and behavior  BACK: no swelling or mass;   VASCULAR: ++ distal peripheral pulses  SKIN: warm and dry.       Labs Reviewed:                         10.5   5.87  )-----------( 174      ( 2020 01:43 )             30.2     07-25    130<L>  |  96  |  25<H>  ----------------------------<  83  3.8   |  27  |  1.43<H>    Ca    8.2<L>      2020 01:43  Mg     2.0     07-25    TPro  6.5  /  Alb  3.2<L>  /  TBili  0.5  /  DBili  x   /  AST  25  /  ALT  32  /  AlkPhos  39<L>  07-25    CARDIAC MARKERS ( 2020 01:43 )  <0.015 ng/mL / x     / x     / x     / x          Urinalysis Basic - ( 2020 01:47 )    Color: Yellow / Appearance: Clear / S.005 / pH: x  Gluc: x / Ketone: Negative  / Bili: Negative / Urobili: Negative   Blood: x / Protein: Negative / Nitrite: Negative   Leuk Esterase: Negative / RBC: x / WBC x   Sq Epi: x / Non Sq Epi: x / Bacteria: x        BNP:   MEDICATIONS  (STANDING):  heparin   Injectable 5000 Unit(s) SubCutaneous every 12 hours  sodium chloride 0.9%. 1000 milliLiter(s) (65 mL/Hr) IV Continuous <Continuous>  CXR reviewed  EKG Reviewed  Previous admission data reviewed    87 y/o M with HTN , HLD, Cantonese speaking admitted with persistent vertiginous symptoms, feels "dizzy" but off balance. Previous cardiac w/u negative, with negative cartoid dopplers with antegrade vertebral flow, normal echo EF with mod- severe TR, possible ASD/ PFO.     1. Vertigo- exacerbated by orthostasis, hyponatremia. Cardiac etiology unlikely, will request neurology consultation for MRI brain to eval posterior circulation.   2. Orthostatic hypotension- check orthostatics q shift change, cont IVF, salt tabs   3. Hyponatremia- consistent with hypo-osmolar with decreased solute intake, urine sp gravity low. Continue salt tabs.   4. HTN   5. Gout   6. Gerd     Plan of care discussed with patient ;  all questions and concerns were addressed.  Discussed with Patient's family

## 2020-07-25 NOTE — ED PROVIDER NOTE - OBJECTIVE STATEMENT
86 year old male PMH HTN, HLD, GERD, BPH, vertigo coming in for dizziness. was discharged 6 days ago for the same complaints and states that for the past 2 days symptoms have returned and he feels worse. denies all other complaints.

## 2020-07-25 NOTE — ED ADULT NURSE NOTE - OBJECTIVE STATEMENT
Pt c/o of dizziness since yesterday and worsening today. Pt recently discharged for the hospital on Friday for the same issue. Pt denies fever, N,V, and diarrhea. Denies any other complaint. Gait is steady upon assessment.

## 2020-07-25 NOTE — ED PROVIDER NOTE - PMH
BPH (benign prostatic hyperplasia)    GERD (gastroesophageal reflux disease)    Gout    HTN (hypertension)

## 2020-07-25 NOTE — ED ADULT NURSE REASSESSMENT NOTE - NS ED NURSE REASSESS COMMENT FT1
Pt voids without any difficulty in the urine. Bladder scan showed 43cc of urine in the bladder. Pt does not have any bladder distension. Principal Discharge DX:	Abdominal pain

## 2020-07-25 NOTE — H&P ADULT - ASSESSMENT
86 year old [place] [place]  from home lives with wife ,walks independently came with hx of HTN, HLD, GERD, BPH, vertigo presents with c/o severe dizziness lasting whole day today, pt was feeling better after getting discharged from this hospital last time but vertigo was never completly gone.  pT denies any  acute visual changes, phonophobia, aura, headache, no loss of vision or hearing, no ringing in ears, no fullness in ears. no fever, no abd pain, no dysuria. Pt says no covid exposure, no syncope, no focal weakness, no trouble swallowing. Pt never followed up with outpt neurologist recommended on discharge. 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 Pt ahs no changes in his sensation in extremities.

## 2020-07-25 NOTE — CONSULT NOTE ADULT - SUBJECTIVE AND OBJECTIVE BOX
86 RHM presents with persistent dizziness yesterday, although he denies room-spinning sensation. While in the ED, his dizziness has mostly resolved. He was previously admitted for vertigo, but has not followed up with a physician about this.    Dx: Syncopal event, less likely peripheral vertigo    Recs:  - Check orthostatic BP & HR with vital signs  - Encourage plentiful fluid hydration (2 liters water per day)  - Caution with position changes  - May use meclizine 12.5 - 25 mg PO TID as needed if vertigo symptoms recur  - No new head imaging needed now - Recent CT Head & Carotid Doppler studies were normal        NOTE TO BE COMPLETED - PLEASE REFER TO ABOVE ONLY AND IGNORE INFORMATION BELOW        NEUROLOGY CONSULT NOTE    NAME:  MADISON ARIAS    CHIEF COMPLAINT:  Patient is a 86y old  Male who presents with a chief complaint of DIZZINESS (25 Jul 2020 05:56)    HPI:  86 year old male [place]e ([place] dialect)  from home lives with wife ,walks independently came with hx of HTN, HLD, GERD, BPH, vertigo presents with c/o severe dizziness lasting whole day today, pt was feeling better after getting discharged from this hospital last time but vertigo was never completely gone.  Patient denies any acute visual changes, phonophobia, aura, headache, no loss of vision or hearing, no ringing in ears, no fullness in ears. no fever, no abd pain, no dysuria. Pt says no covid exposure, no syncope, no focal weakness, no trouble swallowing. Pt never followed up with outpt neurologist recommended on discharge. Pt daughter was contacted who endorsed that pt felt dizziness whole day but pt has no new complaints. 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 Pt ahs no changes in his sensation in extremities. (25 Jul 2020 05:56)    NEURO HPI:  86 RHM presents with persistent dizziness yesterday, although he denies room-spinning sensation. While in the ED, his dizziness has mostly resolved. He was previously admitted for vertigo, but has not followed up with a physician about this.    PAST MEDICAL & SURGICAL HISTORY:  BPH (benign prostatic hyperplasia)  Gout  GERD (gastroesophageal reflux disease)  HTN (hypertension)  No significant past surgical history      MEDICATIONS:  aspirin  chewable 81 milliGRAM(s) Oral daily  atorvastatin 10 milliGRAM(s) Oral at bedtime  heparin   Injectable 5000 Unit(s) SubCutaneous every 12 hours  meclizine 25 milliGRAM(s) Oral two times a day PRN  metoprolol succinate ER 12.5 milliGRAM(s) Oral daily  NIFEdipine XL 30 milliGRAM(s) Oral daily  pantoprazole    Tablet 40 milliGRAM(s) Oral before breakfast  sodium chloride 1 Gram(s) Oral three times a day  sodium chloride 0.9% Bolus 500 milliLiter(s) IV Bolus once  sodium chloride 0.9%. 1000 milliLiter(s) IV Continuous <Continuous>  tamsulosin 0.4 milliGRAM(s) Oral at bedtime      ALLERGIES:  No Known Drug Allergies  shellfish (Other)      FAMILY HISTORY:  No pertinent family history in first degree relatives      SOCIAL HISTORY:  Denies alcohol, tobacco, and illicit drug use    REVIEW OF SYSTEMS:  GENERAL: No fever, weight changes, fatigue  EYES: No eye pain or discharge  EAR/NOSE/MOUTH/THROAT: No sinus or throat pain; No difficulty hearing  NECK: No pain or stiffness  RESPIRATORY: No cough, wheezing, chills, or hemoptysis  CARDIOVASCULAR: No chest pain, palpitations, shortness of breath, or dyspnea on exertion  GASTROINTESTINAL: No abdominal pain, nausea, vomiting, hematemesis, diarrhea, or constipation  GENITOURINARY: No dysuria, frequency, hematuria, or incontinence  SKIN: No rashes or lesions  ENDOCRINE: No heat or cold intolerance  HEMATOLOGIC: No easy bruising or bleeding  PSYCHIATRIC: No depression, anxiety, or mood swings  MUSCULOSKELETAL: No joint pain or swelling  NEUROLOGICAL: As per HPI      OBJECTIVE:    Vital Signs Last 24 Hrs  T(C): 36.6 (25 Jul 2020 08:06), Max: 36.7 (25 Jul 2020 00:31)  T(F): 97.9 (25 Jul 2020 08:06), Max: 98.1 (25 Jul 2020 00:31)  HR: 68 (25 Jul 2020 08:06) (68 - 68)  BP: 138/89 (25 Jul 2020 08:06) (138/89 - 140/66)  BP(mean): --  RR: 18 (25 Jul 2020 08:06) (16 - 18)  SpO2: 98% (25 Jul 2020 08:06) (96% - 98%)    General Examination:  General: No acute distress  HEENT: Atraumatic, Normocephalic  Respiratory: CTA B/l.  No crackles, rhonchi, or wheezes.  Cardiovascular: RRR.  Normal S1 & S2.  Normal b/l radial and pedal pulses.    Neurological Examination:  General / Mental Status: AAO x 3.  No aphasia or dysarthria.  Naming and repetition intact.  Cranial Nerves: VFF x 4.  PERRL.  EOMI x 2, No nystagmus or diplopia.  B/l V1-V3 equal and intact to light touch and pinprick.  Symmetric facial movement and palate elevation.  B/l hearing equal to finger rub.  5/5 strength with b/l sternocleidomastoid & trapezius.  Midline tongue protrusion, with no atrophy or fasciculations.  Motor: Normal bulk & tone in all four extremities.  5/5 strength throughout all four extremities.  No downward drift, rigidity, spasticity, or tremors in any of the four extremities.  Sensory: Intact to light touch and pinprick in all four extremities.  Negative Romberg.  Reflex: 2+ and symmetric at b/l biceps, triceps, brachioradialis, patellae, and ankles.  Downgoing toes b/l.  Coordination: No dysmetria with b/l finger-to-nose and heel raise tests.  Symmetric rapid alternating movements b/l.  Gait: Normal, narrow-based gait.  No difficulty with tiptoe, heel, and tandem gaits.      Laboratory Values:    CBC Full  -  ( 25 Jul 2020 10:50 )  WBC Count : 5.46 K/uL  RBC Count : 4.18 M/uL  Hemoglobin : 12.7 g/dL  Hematocrit : 37.9 %  Platelet Count - Automated : 196 K/uL  Mean Cell Volume : 90.7 fl  Mean Cell Hemoglobin : 30.4 pg  Mean Cell Hemoglobin Concentration : 33.5 gm/dL  Auto Neutrophil # : 4.25 K/uL  Auto Lymphocyte # : 0.82 K/uL  Auto Monocyte # : 0.33 K/uL  Auto Eosinophil # : 0.03 K/uL  Auto Basophil # : 0.02 K/uL  Auto Neutrophil % : 77.9 %  Auto Lymphocyte % : 15.0 %  Auto Monocyte % : 6.0 %  Auto Eosinophil % : 0.5 %  Auto Basophil % : 0.4 %      07-25    130<L>  |  96  |  25<H>  ----------------------------<  83  3.8   |  27  |  1.43<H>    Ca    8.2<L>      25 Jul 2020 01:43  Mg     2.0     07-25    TPro  6.5  /  Alb  3.2<L>  /  TBili  0.5  /  DBili  x   /  AST  25  /  ALT  32  /  AlkPhos  39<L>  07-25    07-14 Chol 117 LDL 37 HDL 67 Trig 63                Neuroimaging:      Please contact the Neurology consult service with any questions.    Angus Best MD   of Neurology  [facility] NEUROLOGY CONSULT NOTE    NAME:  MADISON ARIAS    CHIEF COMPLAINT:  Patient is a 86y old  Male who presents with a chief complaint of DIZZINESS (25 Jul 2020 05:56)    HPI:  86 year old male Cantonese ([place] dialect)  from home lives with wife ,walks independently came with hx of HTN, HLD, GERD, BPH, vertigo presents with c/o severe dizziness lasting whole day today, pt was feeling better after getting discharged from this hospital last time but vertigo was never completely gone.  Patient denies any acute visual changes, phonophobia, aura, headache, no loss of vision or hearing, no ringing in ears, no fullness in ears. no fever, no abd pain, no dysuria. Pt says no COVID-19 exposure, no syncope, no focal weakness, no trouble swallowing. Pt never followed up with outpt neurologist recommended on discharge. Pt daughter was contacted who endorsed that pt felt dizziness whole day but pt has no new complaints. 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 Pt has no changes in his sensation in extremities. (25 Jul 2020 05:56)    NEURO HPI:  86 RHM presents with persistent dizziness yesterday, although he denies room-spinning sensation. While in the ED, his dizziness has mostly resolved. He was previously admitted for vertigo, but has not followed up with a physician about this.    PAST MEDICAL & SURGICAL HISTORY:  BPH (benign prostatic hyperplasia)  Gout  GERD (gastroesophageal reflux disease)  HTN (hypertension)  No significant past surgical history    MEDICATIONS:  aspirin  chewable 81 milliGRAM(s) Oral daily  atorvastatin 10 milliGRAM(s) Oral at bedtime  heparin   Injectable 5000 Unit(s) SubCutaneous every 12 hours  meclizine 25 milliGRAM(s) Oral two times a day PRN  metoprolol succinate ER 12.5 milliGRAM(s) Oral daily  NIFEdipine XL 30 milliGRAM(s) Oral daily  pantoprazole    Tablet 40 milliGRAM(s) Oral before breakfast  sodium chloride 1 Gram(s) Oral three times a day  sodium chloride 0.9% Bolus 500 milliLiter(s) IV Bolus once  sodium chloride 0.9%. 1000 milliLiter(s) IV Continuous <Continuous>  tamsulosin 0.4 milliGRAM(s) Oral at bedtime    ALLERGIES:  No Known Drug Allergies  shellfish (Other)    FAMILY HISTORY:  No reported family history of syncope    SOCIAL HISTORY:  No reported alcohol, tobacco, or illicit drug use    REVIEW OF SYSTEMS:  GENERAL: No fever, weight changes  EYES: No eye pain or discharge  EAR/NOSE/MOUTH/THROAT: No sinus or throat pain  NECK: No pain or stiffness  RESPIRATORY: No cough, wheezing, chills  CARDIOVASCULAR: No chest pain, palpitations, shortness of breath  GASTROINTESTINAL: No abdominal pain, nausea, vomiting, hematemesis, diarrhea, or constipation  GENITOURINARY: No dysuria, frequency, hematuria  SKIN: No rashes or lesions  ENDOCRINE: No heat or cold intolerance  HEMATOLOGIC: No easy bruising or bleeding  PSYCHIATRIC: No depression, anxiety, or mood swings  MUSCULOSKELETAL: No joint pain or swelling  NEUROLOGICAL: As per HPI      OBJECTIVE:    Vital Signs Last 24 Hrs  T(C): 36.6 (25 Jul 2020 08:06), Max: 36.7 (25 Jul 2020 00:31)  T(F): 97.9 (25 Jul 2020 08:06), Max: 98.1 (25 Jul 2020 00:31)  HR: 68 (25 Jul 2020 08:06) (68 - 68)  BP: 138/89 (25 Jul 2020 08:06) (138/89 - 140/66)  RR: 18 (25 Jul 2020 08:06) (16 - 18)  SpO2: 98% (25 Jul 2020 08:06) (96% - 98%)    General Examination:  General: No acute distress  HEENT: Atraumatic, Normocephalic  Respiratory: CTA B/l.  No crackles, rhonchi, or wheezes.  Cardiovascular: RRR.  Normal S1 & S2.  Normal b/l radial and pedal pulses.    Neurological Examination:  General / Mental Status: AAO x 3.  No aphasia or dysarthria.  Naming and repetition intact.  Cranial Nerves: VFF x 4.  PERRL.  EOMI x 2, No nystagmus or diplopia.  B/l V1-V3 equal and intact to light touch and pinprick.  Symmetric facial movement and palate elevation.  B/l hearing equal to finger rub.  Negative Crum-Hallpike maneuver b/l.  5/5 strength with b/l sternocleidomastoid & trapezius.  Midline tongue protrusion, with no atrophy or fasciculations.  Motor: Normal bulk & tone in all four extremities.  5/5 strength throughout all four extremities.  No downward drift, rigidity, spasticity, or tremors in any of the four extremities.  Sensory: Intact to light touch and pinprick in all four extremities.  Reflex: 2+ and symmetric at b/l biceps, triceps, brachioradialis, patellae, and ankles.  Downgoing toes b/l.  Coordination: No dysmetria with b/l finger-to-nose and heel raise tests.  Gait and Romberg testing deferred per patient request.    NIHSS 0  MRS 0      Laboratory Values:    CBC Full  -  ( 25 Jul 2020 10:50 )  WBC Count : 5.46 K/uL  RBC Count : 4.18 M/uL  Hemoglobin : 12.7 g/dL  Hematocrit : 37.9 %  Platelet Count - Automated : 196 K/uL  Mean Cell Volume : 90.7 fl  Mean Cell Hemoglobin : 30.4 pg  Mean Cell Hemoglobin Concentration : 33.5 gm/dL  Auto Neutrophil # : 4.25 K/uL  Auto Lymphocyte # : 0.82 K/uL  Auto Monocyte # : 0.33 K/uL  Auto Eosinophil # : 0.03 K/uL  Auto Basophil # : 0.02 K/uL  Auto Neutrophil % : 77.9 %  Auto Lymphocyte % : 15.0 %  Auto Monocyte % : 6.0 %  Auto Eosinophil % : 0.5 %  Auto Basophil % : 0.4 %      07-25    130<L>  |  96  |  25<H>  ----------------------------<  83  3.8   |  27  |  1.43<H>    Ca    8.2<L>      25 Jul 2020 01:43  Mg     2.0     07-25    TPro  6.5  /  Alb  3.2<L>  /  TBili  0.5  /  DBili  x   /  AST  25  /  ALT  32  /  AlkPhos  39<L>  07-25 07-14 Chol 117 LDL 37 HDL 67 Trig 63      Neuroimaging:    CT Head (7/13/20): No acute intracranial abnormalit      Assessment:    86 RHM with syncopal event, less likely peripheral vertigo      Recommendations:    - Check orthostatic BP & HR with vital signs    - Encourage plentiful fluid hydration (2 liters water per day)    - Caution with position changes    - May use meclizine 12.5 - 25 mg PO TID as needed if vertigo symptoms recur    - No new head imaging needed now - Recent CT Head & Carotid Doppler studies were normal      Please contact the Neurology consult service with any questions.    Angus Best MD   of Neurology  [facility]

## 2020-07-25 NOTE — H&P ADULT - PROBLEM SELECTOR PLAN 6
Pt has hx of htn  -on candesartan , metoprolol  , nefidipine   -will hold metoprolol insetting of bradycardia  -holding losartan in setting of [place] Pt has hx of htn  -on candesartan , metoprolol  , nefidipine   -holding losartan in setting of [place]  -will resume metoprolol on low dose with parameters

## 2020-07-25 NOTE — H&P ADULT - PROBLEM SELECTOR PLAN 1
-pt p/w dizziness for for whole day yesterday and medication not helping,   -EKG shows NS 68  -will admit to tele  - last week on admission pt got echo with bubble study shows >55 and carotid dopplars wnl  -pt will benefit from MRI to r/o post circulation stroke ,  -neurologist consulted Dr. scales -pt p/w dizziness for for whole day yesterday and medication not helping,   -EKG shows NS 68  -will admit to tele  - last week on admission pt got echo with bubble study shows >55 and carotid dopplars wnl  -pt will benefit from MRI to r/o post circulation stroke ,MRI FORM done n placed in chart, will wait for order placement from neuro recomendations  -neurologist consulted Dr. scales

## 2020-07-25 NOTE — ED PROVIDER NOTE - CLINICAL SUMMARY MEDICAL DECISION MAKING FREE TEXT BOX
86 year old male with dizziness. vitals WNL. PE as above.  labs are unremarkable-at baseline. UA WNL. given 1L NS bolus and meclizine. symptoms not resolved. will admit for further eval.

## 2020-07-26 LAB
ALBUMIN SERPL ELPH-MCNC: 3.4 G/DL — LOW (ref 3.5–5)
ALP SERPL-CCNC: 41 U/L — SIGNIFICANT CHANGE UP (ref 40–120)
ALT FLD-CCNC: 30 U/L DA — SIGNIFICANT CHANGE UP (ref 10–60)
ANION GAP SERPL CALC-SCNC: 7 MMOL/L — SIGNIFICANT CHANGE UP (ref 5–17)
AST SERPL-CCNC: 22 U/L — SIGNIFICANT CHANGE UP (ref 10–40)
BASOPHILS # BLD AUTO: 0.04 K/UL — SIGNIFICANT CHANGE UP (ref 0–0.2)
BASOPHILS NFR BLD AUTO: 0.6 % — SIGNIFICANT CHANGE UP (ref 0–2)
BILIRUB SERPL-MCNC: 0.7 MG/DL — SIGNIFICANT CHANGE UP (ref 0.2–1.2)
BUN SERPL-MCNC: 19 MG/DL — HIGH (ref 7–18)
CALCIUM SERPL-MCNC: 8.8 MG/DL — SIGNIFICANT CHANGE UP (ref 8.4–10.5)
CHLORIDE SERPL-SCNC: 101 MMOL/L — SIGNIFICANT CHANGE UP (ref 96–108)
CO2 SERPL-SCNC: 26 MMOL/L — SIGNIFICANT CHANGE UP (ref 22–31)
CREAT SERPL-MCNC: 1.07 MG/DL — SIGNIFICANT CHANGE UP (ref 0.5–1.3)
CULTURE RESULTS: SIGNIFICANT CHANGE UP
EOSINOPHIL # BLD AUTO: 0.13 K/UL — SIGNIFICANT CHANGE UP (ref 0–0.5)
EOSINOPHIL NFR BLD AUTO: 1.9 % — SIGNIFICANT CHANGE UP (ref 0–6)
GLUCOSE SERPL-MCNC: 86 MG/DL — SIGNIFICANT CHANGE UP (ref 70–99)
HCT VFR BLD CALC: 38 % — LOW (ref 39–50)
HGB BLD-MCNC: 12.9 G/DL — LOW (ref 13–17)
IMM GRANULOCYTES NFR BLD AUTO: 0.3 % — SIGNIFICANT CHANGE UP (ref 0–1.5)
LYMPHOCYTES # BLD AUTO: 1.95 K/UL — SIGNIFICANT CHANGE UP (ref 1–3.3)
LYMPHOCYTES # BLD AUTO: 28.6 % — SIGNIFICANT CHANGE UP (ref 13–44)
MAGNESIUM SERPL-MCNC: 2.2 MG/DL — SIGNIFICANT CHANGE UP (ref 1.6–2.6)
MCHC RBC-ENTMCNC: 30.5 PG — SIGNIFICANT CHANGE UP (ref 27–34)
MCHC RBC-ENTMCNC: 33.9 GM/DL — SIGNIFICANT CHANGE UP (ref 32–36)
MCV RBC AUTO: 89.8 FL — SIGNIFICANT CHANGE UP (ref 80–100)
MONOCYTES # BLD AUTO: 0.64 K/UL — SIGNIFICANT CHANGE UP (ref 0–0.9)
MONOCYTES NFR BLD AUTO: 9.4 % — SIGNIFICANT CHANGE UP (ref 2–14)
NEUTROPHILS # BLD AUTO: 4.03 K/UL — SIGNIFICANT CHANGE UP (ref 1.8–7.4)
NEUTROPHILS NFR BLD AUTO: 59.2 % — SIGNIFICANT CHANGE UP (ref 43–77)
NRBC # BLD: 0 /100 WBCS — SIGNIFICANT CHANGE UP (ref 0–0)
PHOSPHATE SERPL-MCNC: 2.4 MG/DL — LOW (ref 2.5–4.5)
PLATELET # BLD AUTO: 195 K/UL — SIGNIFICANT CHANGE UP (ref 150–400)
POTASSIUM SERPL-MCNC: 3.9 MMOL/L — SIGNIFICANT CHANGE UP (ref 3.5–5.3)
POTASSIUM SERPL-SCNC: 3.9 MMOL/L — SIGNIFICANT CHANGE UP (ref 3.5–5.3)
PROT SERPL-MCNC: 7.1 G/DL — SIGNIFICANT CHANGE UP (ref 6–8.3)
RBC # BLD: 4.23 M/UL — SIGNIFICANT CHANGE UP (ref 4.2–5.8)
RBC # FLD: 13.2 % — SIGNIFICANT CHANGE UP (ref 10.3–14.5)
SODIUM SERPL-SCNC: 134 MMOL/L — LOW (ref 135–145)
SPECIMEN SOURCE: SIGNIFICANT CHANGE UP
WBC # BLD: 6.81 K/UL — SIGNIFICANT CHANGE UP (ref 3.8–10.5)
WBC # FLD AUTO: 6.81 K/UL — SIGNIFICANT CHANGE UP (ref 3.8–10.5)

## 2020-07-26 PROCEDURE — 99232 SBSQ HOSP IP/OBS MODERATE 35: CPT | Mod: GC

## 2020-07-26 RX ORDER — MECLIZINE HCL 12.5 MG
25 TABLET ORAL THREE TIMES A DAY
Refills: 0 | Status: DISCONTINUED | OUTPATIENT
Start: 2020-07-26 | End: 2020-07-27

## 2020-07-26 RX ORDER — SODIUM,POTASSIUM PHOSPHATES 278-250MG
1 POWDER IN PACKET (EA) ORAL
Refills: 0 | Status: COMPLETED | OUTPATIENT
Start: 2020-07-26 | End: 2020-07-27

## 2020-07-26 RX ADMIN — PANTOPRAZOLE SODIUM 40 MILLIGRAM(S): 20 TABLET, DELAYED RELEASE ORAL at 05:55

## 2020-07-26 RX ADMIN — Medication 25 MILLIGRAM(S): at 05:55

## 2020-07-26 RX ADMIN — Medication 1 TABLET(S): at 13:42

## 2020-07-26 RX ADMIN — TAMSULOSIN HYDROCHLORIDE 0.4 MILLIGRAM(S): 0.4 CAPSULE ORAL at 23:01

## 2020-07-26 RX ADMIN — Medication 81 MILLIGRAM(S): at 12:21

## 2020-07-26 RX ADMIN — Medication 1 TABLET(S): at 17:08

## 2020-07-26 RX ADMIN — Medication 25 MILLIGRAM(S): at 23:09

## 2020-07-26 RX ADMIN — SODIUM CHLORIDE 1 GRAM(S): 9 INJECTION INTRAMUSCULAR; INTRAVENOUS; SUBCUTANEOUS at 23:01

## 2020-07-26 RX ADMIN — Medication 30 MILLIGRAM(S): at 05:55

## 2020-07-26 RX ADMIN — Medication 1 TABLET(S): at 09:19

## 2020-07-26 RX ADMIN — SODIUM CHLORIDE 65 MILLILITER(S): 9 INJECTION INTRAMUSCULAR; INTRAVENOUS; SUBCUTANEOUS at 05:55

## 2020-07-26 RX ADMIN — ATORVASTATIN CALCIUM 10 MILLIGRAM(S): 80 TABLET, FILM COATED ORAL at 23:01

## 2020-07-26 RX ADMIN — HEPARIN SODIUM 5000 UNIT(S): 5000 INJECTION INTRAVENOUS; SUBCUTANEOUS at 05:55

## 2020-07-26 RX ADMIN — SODIUM CHLORIDE 1 GRAM(S): 9 INJECTION INTRAMUSCULAR; INTRAVENOUS; SUBCUTANEOUS at 13:42

## 2020-07-26 RX ADMIN — HEPARIN SODIUM 5000 UNIT(S): 5000 INJECTION INTRAVENOUS; SUBCUTANEOUS at 17:08

## 2020-07-26 RX ADMIN — SODIUM CHLORIDE 1 GRAM(S): 9 INJECTION INTRAMUSCULAR; INTRAVENOUS; SUBCUTANEOUS at 05:55

## 2020-07-26 RX ADMIN — Medication 12.5 MILLIGRAM(S): at 05:55

## 2020-07-27 LAB
ALBUMIN SERPL ELPH-MCNC: 3.2 G/DL — LOW (ref 3.5–5)
ALP SERPL-CCNC: 36 U/L — LOW (ref 40–120)
ALT FLD-CCNC: 27 U/L DA — SIGNIFICANT CHANGE UP (ref 10–60)
ANION GAP SERPL CALC-SCNC: 9 MMOL/L — SIGNIFICANT CHANGE UP (ref 5–17)
AST SERPL-CCNC: 19 U/L — SIGNIFICANT CHANGE UP (ref 10–40)
BASOPHILS # BLD AUTO: 0.02 K/UL — SIGNIFICANT CHANGE UP (ref 0–0.2)
BASOPHILS NFR BLD AUTO: 0.3 % — SIGNIFICANT CHANGE UP (ref 0–2)
BILIRUB SERPL-MCNC: 0.6 MG/DL — SIGNIFICANT CHANGE UP (ref 0.2–1.2)
BUN SERPL-MCNC: 18 MG/DL — SIGNIFICANT CHANGE UP (ref 7–18)
CALCIUM SERPL-MCNC: 8.6 MG/DL — SIGNIFICANT CHANGE UP (ref 8.4–10.5)
CHLORIDE SERPL-SCNC: 100 MMOL/L — SIGNIFICANT CHANGE UP (ref 96–108)
CO2 SERPL-SCNC: 25 MMOL/L — SIGNIFICANT CHANGE UP (ref 22–31)
CREAT SERPL-MCNC: 1.07 MG/DL — SIGNIFICANT CHANGE UP (ref 0.5–1.3)
EOSINOPHIL # BLD AUTO: 0.08 K/UL — SIGNIFICANT CHANGE UP (ref 0–0.5)
EOSINOPHIL NFR BLD AUTO: 1.3 % — SIGNIFICANT CHANGE UP (ref 0–6)
GLUCOSE SERPL-MCNC: 78 MG/DL — SIGNIFICANT CHANGE UP (ref 70–99)
HCT VFR BLD CALC: 35.5 % — LOW (ref 39–50)
HGB BLD-MCNC: 12.3 G/DL — LOW (ref 13–17)
IMM GRANULOCYTES NFR BLD AUTO: 0.3 % — SIGNIFICANT CHANGE UP (ref 0–1.5)
LYMPHOCYTES # BLD AUTO: 1.27 K/UL — SIGNIFICANT CHANGE UP (ref 1–3.3)
LYMPHOCYTES # BLD AUTO: 20.2 % — SIGNIFICANT CHANGE UP (ref 13–44)
MAGNESIUM SERPL-MCNC: 2.1 MG/DL — SIGNIFICANT CHANGE UP (ref 1.6–2.6)
MCHC RBC-ENTMCNC: 31 PG — SIGNIFICANT CHANGE UP (ref 27–34)
MCHC RBC-ENTMCNC: 34.6 GM/DL — SIGNIFICANT CHANGE UP (ref 32–36)
MCV RBC AUTO: 89.4 FL — SIGNIFICANT CHANGE UP (ref 80–100)
MONOCYTES # BLD AUTO: 0.51 K/UL — SIGNIFICANT CHANGE UP (ref 0–0.9)
MONOCYTES NFR BLD AUTO: 8.1 % — SIGNIFICANT CHANGE UP (ref 2–14)
NEUTROPHILS # BLD AUTO: 4.4 K/UL — SIGNIFICANT CHANGE UP (ref 1.8–7.4)
NEUTROPHILS NFR BLD AUTO: 69.8 % — SIGNIFICANT CHANGE UP (ref 43–77)
NRBC # BLD: 0 /100 WBCS — SIGNIFICANT CHANGE UP (ref 0–0)
PHOSPHATE SERPL-MCNC: 3.1 MG/DL — SIGNIFICANT CHANGE UP (ref 2.5–4.5)
PLATELET # BLD AUTO: 180 K/UL — SIGNIFICANT CHANGE UP (ref 150–400)
POTASSIUM SERPL-MCNC: 3.6 MMOL/L — SIGNIFICANT CHANGE UP (ref 3.5–5.3)
POTASSIUM SERPL-SCNC: 3.6 MMOL/L — SIGNIFICANT CHANGE UP (ref 3.5–5.3)
PROT SERPL-MCNC: 6.9 G/DL — SIGNIFICANT CHANGE UP (ref 6–8.3)
RBC # BLD: 3.97 M/UL — LOW (ref 4.2–5.8)
RBC # FLD: 13.2 % — SIGNIFICANT CHANGE UP (ref 10.3–14.5)
SODIUM SERPL-SCNC: 134 MMOL/L — LOW (ref 135–145)
WBC # BLD: 6.3 K/UL — SIGNIFICANT CHANGE UP (ref 3.8–10.5)
WBC # FLD AUTO: 6.3 K/UL — SIGNIFICANT CHANGE UP (ref 3.8–10.5)

## 2020-07-27 PROCEDURE — 99232 SBSQ HOSP IP/OBS MODERATE 35: CPT | Mod: GC

## 2020-07-27 RX ORDER — MECLIZINE HCL 12.5 MG
25 TABLET ORAL THREE TIMES A DAY
Refills: 0 | Status: DISCONTINUED | OUTPATIENT
Start: 2020-07-27 | End: 2020-07-29

## 2020-07-27 RX ORDER — LOSARTAN POTASSIUM 100 MG/1
25 TABLET, FILM COATED ORAL DAILY
Refills: 0 | Status: DISCONTINUED | OUTPATIENT
Start: 2020-07-27 | End: 2020-07-27

## 2020-07-27 RX ADMIN — SODIUM CHLORIDE 1 GRAM(S): 9 INJECTION INTRAMUSCULAR; INTRAVENOUS; SUBCUTANEOUS at 22:38

## 2020-07-27 RX ADMIN — ATORVASTATIN CALCIUM 10 MILLIGRAM(S): 80 TABLET, FILM COATED ORAL at 22:39

## 2020-07-27 RX ADMIN — Medication 81 MILLIGRAM(S): at 13:41

## 2020-07-27 RX ADMIN — Medication 1 TABLET(S): at 13:41

## 2020-07-27 RX ADMIN — TAMSULOSIN HYDROCHLORIDE 0.4 MILLIGRAM(S): 0.4 CAPSULE ORAL at 22:38

## 2020-07-27 RX ADMIN — Medication 12.5 MILLIGRAM(S): at 05:59

## 2020-07-27 RX ADMIN — Medication 30 MILLIGRAM(S): at 05:59

## 2020-07-27 RX ADMIN — Medication 25 MILLIGRAM(S): at 22:38

## 2020-07-27 RX ADMIN — SODIUM CHLORIDE 1 GRAM(S): 9 INJECTION INTRAMUSCULAR; INTRAVENOUS; SUBCUTANEOUS at 05:59

## 2020-07-27 RX ADMIN — Medication 1 TABLET(S): at 18:30

## 2020-07-27 RX ADMIN — HEPARIN SODIUM 5000 UNIT(S): 5000 INJECTION INTRAVENOUS; SUBCUTANEOUS at 05:58

## 2020-07-27 RX ADMIN — HEPARIN SODIUM 5000 UNIT(S): 5000 INJECTION INTRAVENOUS; SUBCUTANEOUS at 18:05

## 2020-07-27 RX ADMIN — Medication 1 TABLET(S): at 09:25

## 2020-07-27 RX ADMIN — Medication 25 MILLIGRAM(S): at 05:59

## 2020-07-27 RX ADMIN — SODIUM CHLORIDE 1 GRAM(S): 9 INJECTION INTRAMUSCULAR; INTRAVENOUS; SUBCUTANEOUS at 13:42

## 2020-07-27 RX ADMIN — PANTOPRAZOLE SODIUM 40 MILLIGRAM(S): 20 TABLET, DELAYED RELEASE ORAL at 06:04

## 2020-07-27 NOTE — PROGRESS NOTE ADULT - PROBLEM SELECTOR PLAN 6
Pt has hx of htn  -on candesartan , metoprolol  , nefidipine   -holding losartan in setting of [place]  -will resume metoprolol on low dose with parameters c/w metoprolol and nifedipine  Pt was on Ace/arb which was held due to MARTITA, Now MARTITA improved  BP elevated, will start on ace inh with parameters c/w metoprolol and nifedipine  Pt was on Ace/arb which was held due to MARTITA, Now MARTITA improved

## 2020-07-27 NOTE — PHYSICAL THERAPY INITIAL EVALUATION ADULT - GENERAL OBSERVATIONS, REHAB EVAL
Patient received in supine; AxOx3; presents w/ dizziness. Patient received in supine; AxOx3; presents w/ dizziness and ringing of both ears.

## 2020-07-27 NOTE — PROGRESS NOTE ADULT - PROBLEM SELECTOR PLAN 1
-pt p/w dizziness for for whole day yesterday and medication not helping,   -EKG shows NS 68  -will admit to tele  - last week on admission pt got echo with bubble study shows >55 and carotid dopplars wnl  -pt will benefit from MRI to r/o post circulation stroke ,MRI FORM done n placed in chart, will wait for order placement from neuro recomendations  -neurologist consulted Dr. scales Possible orthostatic vs BPPV  Pt orthostatic positive in ED, Now improved  c/w salt tabs  Will do PT eval for balance training  Pt continue to remain dizzy Possible orthostatic vs BPPV  Pt orthostatic positive in ED, Now improved after IVF  c/w salt tabs  Will do PT eval for balance training  Pt continue to remain dizzy

## 2020-07-27 NOTE — PROGRESS NOTE ADULT - PROBLEM SELECTOR PLAN 3
pt came with cr 1.43,   pt has hx of low oral intake usually  -bladder scan neg  -will start mild hydration  -f/u bmp  renal scan last week wnl  F/U URINE LYTES  -on losartan will hold in setting of kristofer  -avoid any nephrotoxic meds and iv contrast  gfr 24 if ckd its stage 4 that's indication for nephrologist followup. Resolved

## 2020-07-27 NOTE — PROGRESS NOTE ADULT - PROBLEM SELECTOR PLAN 2
pt has chronic hyponatremia  donot use salt in diet  will start on salt tabs   f/u bmp  f/u urine lytes  -f/u Fena  -started pt on regular diet pt has chronic hyponatremia  c/w salt tabs  f/u bmp

## 2020-07-28 LAB
ALBUMIN SERPL ELPH-MCNC: 3 G/DL — LOW (ref 3.5–5)
ALP SERPL-CCNC: 32 U/L — LOW (ref 40–120)
ALT FLD-CCNC: 28 U/L DA — SIGNIFICANT CHANGE UP (ref 10–60)
ANION GAP SERPL CALC-SCNC: 7 MMOL/L — SIGNIFICANT CHANGE UP (ref 5–17)
AST SERPL-CCNC: 19 U/L — SIGNIFICANT CHANGE UP (ref 10–40)
BASOPHILS # BLD AUTO: 0.03 K/UL — SIGNIFICANT CHANGE UP (ref 0–0.2)
BASOPHILS NFR BLD AUTO: 0.4 % — SIGNIFICANT CHANGE UP (ref 0–2)
BILIRUB SERPL-MCNC: 0.5 MG/DL — SIGNIFICANT CHANGE UP (ref 0.2–1.2)
BUN SERPL-MCNC: 19 MG/DL — HIGH (ref 7–18)
CALCIUM SERPL-MCNC: 8.4 MG/DL — SIGNIFICANT CHANGE UP (ref 8.4–10.5)
CHLORIDE SERPL-SCNC: 102 MMOL/L — SIGNIFICANT CHANGE UP (ref 96–108)
CO2 SERPL-SCNC: 28 MMOL/L — SIGNIFICANT CHANGE UP (ref 22–31)
CREAT SERPL-MCNC: 1.15 MG/DL — SIGNIFICANT CHANGE UP (ref 0.5–1.3)
EOSINOPHIL # BLD AUTO: 0.13 K/UL — SIGNIFICANT CHANGE UP (ref 0–0.5)
EOSINOPHIL NFR BLD AUTO: 1.9 % — SIGNIFICANT CHANGE UP (ref 0–6)
GLUCOSE SERPL-MCNC: 80 MG/DL — SIGNIFICANT CHANGE UP (ref 70–99)
HCT VFR BLD CALC: 34.2 % — LOW (ref 39–50)
HGB BLD-MCNC: 11.7 G/DL — LOW (ref 13–17)
IMM GRANULOCYTES NFR BLD AUTO: 0.3 % — SIGNIFICANT CHANGE UP (ref 0–1.5)
LYMPHOCYTES # BLD AUTO: 1.61 K/UL — SIGNIFICANT CHANGE UP (ref 1–3.3)
LYMPHOCYTES # BLD AUTO: 23.6 % — SIGNIFICANT CHANGE UP (ref 13–44)
MAGNESIUM SERPL-MCNC: 2.1 MG/DL — SIGNIFICANT CHANGE UP (ref 1.6–2.6)
MCHC RBC-ENTMCNC: 30.8 PG — SIGNIFICANT CHANGE UP (ref 27–34)
MCHC RBC-ENTMCNC: 34.2 GM/DL — SIGNIFICANT CHANGE UP (ref 32–36)
MCV RBC AUTO: 90 FL — SIGNIFICANT CHANGE UP (ref 80–100)
MONOCYTES # BLD AUTO: 0.56 K/UL — SIGNIFICANT CHANGE UP (ref 0–0.9)
MONOCYTES NFR BLD AUTO: 8.2 % — SIGNIFICANT CHANGE UP (ref 2–14)
NEUTROPHILS # BLD AUTO: 4.47 K/UL — SIGNIFICANT CHANGE UP (ref 1.8–7.4)
NEUTROPHILS NFR BLD AUTO: 65.6 % — SIGNIFICANT CHANGE UP (ref 43–77)
NRBC # BLD: 0 /100 WBCS — SIGNIFICANT CHANGE UP (ref 0–0)
PHOSPHATE SERPL-MCNC: 3.4 MG/DL — SIGNIFICANT CHANGE UP (ref 2.5–4.5)
PLATELET # BLD AUTO: 180 K/UL — SIGNIFICANT CHANGE UP (ref 150–400)
POTASSIUM SERPL-MCNC: 3.8 MMOL/L — SIGNIFICANT CHANGE UP (ref 3.5–5.3)
POTASSIUM SERPL-SCNC: 3.8 MMOL/L — SIGNIFICANT CHANGE UP (ref 3.5–5.3)
PROT SERPL-MCNC: 6.5 G/DL — SIGNIFICANT CHANGE UP (ref 6–8.3)
RBC # BLD: 3.8 M/UL — LOW (ref 4.2–5.8)
RBC # FLD: 13.3 % — SIGNIFICANT CHANGE UP (ref 10.3–14.5)
SODIUM SERPL-SCNC: 137 MMOL/L — SIGNIFICANT CHANGE UP (ref 135–145)
WBC # BLD: 6.82 K/UL — SIGNIFICANT CHANGE UP (ref 3.8–10.5)
WBC # FLD AUTO: 6.82 K/UL — SIGNIFICANT CHANGE UP (ref 3.8–10.5)

## 2020-07-28 PROCEDURE — 99233 SBSQ HOSP IP/OBS HIGH 50: CPT | Mod: GC

## 2020-07-28 RX ORDER — SODIUM CHLORIDE 9 MG/ML
1000 INJECTION, SOLUTION INTRAVENOUS
Refills: 0 | Status: DISCONTINUED | OUTPATIENT
Start: 2020-07-28 | End: 2020-07-29

## 2020-07-28 RX ORDER — ERGOCALCIFEROL 1.25 MG/1
50000 CAPSULE ORAL
Refills: 0 | Status: DISCONTINUED | OUTPATIENT
Start: 2020-07-28 | End: 2020-07-29

## 2020-07-28 RX ADMIN — HEPARIN SODIUM 5000 UNIT(S): 5000 INJECTION INTRAVENOUS; SUBCUTANEOUS at 05:45

## 2020-07-28 RX ADMIN — Medication 12.5 MILLIGRAM(S): at 05:46

## 2020-07-28 RX ADMIN — SODIUM CHLORIDE 1 GRAM(S): 9 INJECTION INTRAMUSCULAR; INTRAVENOUS; SUBCUTANEOUS at 22:54

## 2020-07-28 RX ADMIN — SODIUM CHLORIDE 1 GRAM(S): 9 INJECTION INTRAMUSCULAR; INTRAVENOUS; SUBCUTANEOUS at 05:46

## 2020-07-28 RX ADMIN — SODIUM CHLORIDE 75 MILLILITER(S): 9 INJECTION, SOLUTION INTRAVENOUS at 13:21

## 2020-07-28 RX ADMIN — Medication 25 MILLIGRAM(S): at 22:54

## 2020-07-28 RX ADMIN — HEPARIN SODIUM 5000 UNIT(S): 5000 INJECTION INTRAVENOUS; SUBCUTANEOUS at 18:38

## 2020-07-28 RX ADMIN — PANTOPRAZOLE SODIUM 40 MILLIGRAM(S): 20 TABLET, DELAYED RELEASE ORAL at 05:46

## 2020-07-28 RX ADMIN — Medication 25 MILLIGRAM(S): at 13:21

## 2020-07-28 RX ADMIN — Medication 30 MILLIGRAM(S): at 05:46

## 2020-07-28 RX ADMIN — SODIUM CHLORIDE 1 GRAM(S): 9 INJECTION INTRAMUSCULAR; INTRAVENOUS; SUBCUTANEOUS at 13:21

## 2020-07-28 RX ADMIN — TAMSULOSIN HYDROCHLORIDE 0.4 MILLIGRAM(S): 0.4 CAPSULE ORAL at 22:54

## 2020-07-28 RX ADMIN — ATORVASTATIN CALCIUM 10 MILLIGRAM(S): 80 TABLET, FILM COATED ORAL at 22:54

## 2020-07-28 RX ADMIN — Medication 25 MILLIGRAM(S): at 05:46

## 2020-07-28 RX ADMIN — Medication 81 MILLIGRAM(S): at 12:20

## 2020-07-28 NOTE — PROGRESS NOTE ADULT - PROBLEM SELECTOR PLAN 5
pt has a hx of gout   on Uloric 40mg   no active tophi or gouty joint noted.
pt has a hx of gout   on Uloric 40mg   no active tophi or gouty joint noted.

## 2020-07-28 NOTE — PROGRESS NOTE ADULT - PROBLEM SELECTOR PLAN 8
RISK                                                          Points  [] Previous VTE                                           3  [] Thrombophilia                                        2  [] Lower limb paralysis                              2   [] Current Cancer                                       2   [x] Immobilization > 24 hrs                        1  [] ICU/CCU stay > 24 hours                       1  [x] Age > 60                                                   1    sc heparin
RISK                                                          Points  [] Previous VTE                                           3  [] Thrombophilia                                        2  [] Lower limb paralysis                              2   [] Current Cancer                                       2   [x] Immobilization > 24 hrs                        1  [] ICU/CCU stay > 24 hours                       1  [x] Age > 60                                                   1    sc heparin

## 2020-07-28 NOTE — PROGRESS NOTE ADULT - PROBLEM SELECTOR PLAN 6
c/w metoprolol and nifedipine  Pt was on Ace/arb which was held due to MARTITA, Now MARTITA improved

## 2020-07-28 NOTE — PROGRESS NOTE ADULT - SUBJECTIVE AND OBJECTIVE BOX
PGY 3 Note discussed primary attending    Patient is a 86y old  Male who presents with a chief complaint of DIZZINESS (26 Jul 2020 16:32)      INTERVAL HPI/OVERNIGHT EVENTS: offers no new complaints; current symptoms resolving    MEDICATIONS  (STANDING):  aspirin  chewable 81 milliGRAM(s) Oral daily  atorvastatin 10 milliGRAM(s) Oral at bedtime  heparin   Injectable 5000 Unit(s) SubCutaneous every 12 hours  metoprolol succinate ER 12.5 milliGRAM(s) Oral daily  NIFEdipine XL 30 milliGRAM(s) Oral daily  pantoprazole    Tablet 40 milliGRAM(s) Oral before breakfast  potassium phosphate / sodium phosphate Tablet (K-PHOS No. 2) 1 Tablet(s) Oral three times a day with meals  sodium chloride 1 Gram(s) Oral three times a day  sodium chloride 0.9%. 1000 milliLiter(s) (65 mL/Hr) IV Continuous <Continuous>  tamsulosin 0.4 milliGRAM(s) Oral at bedtime    MEDICATIONS  (PRN):  meclizine 25 milliGRAM(s) Oral three times a day PRN Dizziness      __________________________________________________  REVIEW OF SYSTEMS:    CONSTITUTIONAL: No fever,   EYES: no acute visual disturbances  NECK: No pain or stiffness  RESPIRATORY: No cough; No shortness of breath  CARDIOVASCULAR: No chest pain, no palpitations  GASTROINTESTINAL: No pain. No nausea or vomiting; No diarrhea   NEUROLOGICAL: No headache or numbness, no tremors  MUSCULOSKELETAL: No joint pain, no muscle pain  GENITOURINARY: no dysuria, no frequency, no hesitancy  PSYCHIATRY: no depression , no anxiety  ALL OTHER  ROS negative        Vital Signs Last 24 Hrs  T(C): 36.4 (27 Jul 2020 05:13), Max: 37.1 (26 Jul 2020 21:16)  T(F): 97.6 (27 Jul 2020 05:13), Max: 98.7 (26 Jul 2020 21:16)  HR: 76 (27 Jul 2020 05:13) (69 - 76)  BP: 139/58 (27 Jul 2020 05:13) (119/47 - 139/58)  BP(mean): --  RR: 16 (27 Jul 2020 05:13) (16 - 16)  SpO2: 98% (27 Jul 2020 05:13) (97% - 98%)    ________________________________________________  PHYSICAL EXAM:  GENERAL: NAD  HEENT: Normocephalic;  conjunctivae and sclerae clear; moist mucous membranes;   NECK : supple  CHEST/LUNG: Clear to auscultation bilaterally with good air entry   HEART: S1 S2  regular; no murmurs, gallops or rubs  ABDOMEN: Soft, Nontender, Nondistended; Bowel sounds present  EXTREMITIES: no cyanosis; no edema; no calf tenderness  SKIN: warm and dry; no rash  NERVOUS SYSTEM:  Awake and alert; Oriented  to place, person and time ; no new deficits    _________________________________________________  LABS:                        12.3   6.30  )-----------( 180      ( 27 Jul 2020 06:37 )             35.5     07-27    134<L>  |  100  |  18  ----------------------------<  78  3.6   |  25  |  1.07    Ca    8.6      27 Jul 2020 06:37  Phos  3.1     07-27  Mg     2.1     07-27    TPro  6.9  /  Alb  3.2<L>  /  TBili  0.6  /  DBili  x   /  AST  19  /  ALT  27  /  AlkPhos  36<L>  07-27        CAPILLARY BLOOD GLUCOSE            RADIOLOGY & ADDITIONAL TESTS:    Imaging Personally Reviewed:  YES/NO    Consultant(s) Notes Reviewed:   YES/ No    Care Discussed with Consultants :     Plan of care was discussed with patient and /or primary care giver; all questions and concerns were addressed and care was aligned with patient's wishes.
PGY 3 Note discussed primary attending    Patient is a 86y old  Male who presents with a chief complaint of DIZZINESS (27 Jul 2020 08:40)      INTERVAL HPI/OVERNIGHT EVENTS: offers no new complaints; current symptoms resolving    MEDICATIONS  (STANDING):  aspirin  chewable 81 milliGRAM(s) Oral daily  atorvastatin 10 milliGRAM(s) Oral at bedtime  heparin   Injectable 5000 Unit(s) SubCutaneous every 12 hours  meclizine 25 milliGRAM(s) Oral three times a day  metoprolol succinate ER 12.5 milliGRAM(s) Oral daily  NIFEdipine XL 30 milliGRAM(s) Oral daily  pantoprazole    Tablet 40 milliGRAM(s) Oral before breakfast  sodium chloride 1 Gram(s) Oral three times a day  tamsulosin 0.4 milliGRAM(s) Oral at bedtime    MEDICATIONS  (PRN):      __________________________________________________  REVIEW OF SYSTEMS:    CONSTITUTIONAL: No fever,   EYES: no acute visual disturbances  NECK: No pain or stiffness  RESPIRATORY: No cough; No shortness of breath  CARDIOVASCULAR: No chest pain, no palpitations  GASTROINTESTINAL: No pain. No nausea or vomiting; No diarrhea   NEUROLOGICAL: No headache or numbness, no tremors  MUSCULOSKELETAL: No joint pain, no muscle pain  GENITOURINARY: no dysuria, no frequency, no hesitancy  PSYCHIATRY: no depression , no anxiety  ALL OTHER  ROS negative        Vital Signs Last 24 Hrs  T(C): 36.4 (28 Jul 2020 04:52), Max: 36.6 (27 Jul 2020 21:43)  T(F): 97.5 (28 Jul 2020 04:52), Max: 97.9 (27 Jul 2020 21:43)  HR: 68 (28 Jul 2020 09:45) (67 - 79)  BP: 154/69 (28 Jul 2020 09:45) (154/54 - 163/65)  BP(mean): --  RR: 16 (28 Jul 2020 04:52) (16 - 17)  SpO2: 97% (28 Jul 2020 09:45) (96% - 97%)    ________________________________________________  PHYSICAL EXAM:  GENERAL: NAD  HEENT: Normocephalic;  conjunctivae and sclerae clear; moist mucous membranes;   NECK : supple  CHEST/LUNG: Clear to auscultation bilaterally with good air entry   HEART: S1 S2  regular; no murmurs, gallops or rubs  ABDOMEN: Soft, Nontender, Nondistended; Bowel sounds present  EXTREMITIES: no cyanosis; no edema; no calf tenderness  SKIN: warm and dry; no rash  NERVOUS SYSTEM:  Awake and alert; Oriented  to place, person and time ; no new deficits    _________________________________________________  LABS:                        11.7   6.82  )-----------( 180      ( 28 Jul 2020 07:16 )             34.2     07-28    137  |  102  |  19<H>  ----------------------------<  80  3.8   |  28  |  1.15    Ca    8.4      28 Jul 2020 07:16  Phos  3.4     07-28  Mg     2.1     07-28    TPro  6.5  /  Alb  3.0<L>  /  TBili  0.5  /  DBili  x   /  AST  19  /  ALT  28  /  AlkPhos  32<L>  07-28        CAPILLARY BLOOD GLUCOSE            RADIOLOGY & ADDITIONAL TESTS:    Imaging Personally Reviewed:  YES/NO    Consultant(s) Notes Reviewed:   YES/ No    Care Discussed with Consultants :     Plan of care was discussed with patient and /or primary care giver; all questions and concerns were addressed and care was aligned with patient's wishes.
Patient is a 86y old  Male who presents with a chief complaint of DIZZINESS (2020 10:55)    Patient was seen and examined at bedside   Patient called daughter and requested to discuss plan  Complains of worsening of dizziness with ringing in ear, reports room spinning intermittently even when eyes are closed and more when he moves his head      T(C): 36.3 (20 @ 13:01), Max: 36.7 (20 @ 21:15)  HR: 72 (20 @ 13:01) (69 - 74)  BP: 119/47 (20 @ 13:01) (119/47 - 160/59)  RR: 16 (20 @ 13:01) (16 - 18)  SpO2: 97% (20 @ 13:01) (97% - 98%)  Wt(kg): --  I&O's Summary    2020 07:01  -  2020 07:00  --------------------------------------------------------  IN: 0 mL / OUT: 650 mL / NET: -650 mL        REVIEW OF SYSTEMS: denies fever, chills, SOB, palpitations, chest pain, abdominal pain, nausea, vomiting diarrhea, constipation    MEDICATIONS  (STANDING):  aspirin  chewable 81 milliGRAM(s) Oral daily  atorvastatin 10 milliGRAM(s) Oral at bedtime  heparin   Injectable 5000 Unit(s) SubCutaneous every 12 hours  metoprolol succinate ER 12.5 milliGRAM(s) Oral daily  NIFEdipine XL 30 milliGRAM(s) Oral daily  pantoprazole    Tablet 40 milliGRAM(s) Oral before breakfast  potassium phosphate / sodium phosphate Tablet (K-PHOS No. 2) 1 Tablet(s) Oral three times a day with meals  sodium chloride 1 Gram(s) Oral three times a day  sodium chloride 0.9%. 1000 milliLiter(s) (65 mL/Hr) IV Continuous <Continuous>  tamsulosin 0.4 milliGRAM(s) Oral at bedtime    MEDICATIONS  (PRN):  meclizine 25 milliGRAM(s) Oral three times a day PRN Dizziness      PHYSICAL EXAM:  GENERAL: NAD  NERVOUS SYSTEM:  Alert & Oriented X3, Good concentration; no focal deficit, no cerebellar sign or nystagmus   CHEST/LUNG: Clear to auscultation bilaterally; No rales, rhonchi, wheezing, or rubs  HEART: Regular rate and rhythm; No murmurs, rubs, or gallops  ABDOMEN: Soft, Nontender, Nondistended; Bowel sounds present  EXTREMITIES:  2+ Peripheral Pulses, No clubbing, cyanosis, or edema  SKIN: No rashes or lesions    LABS:                        12.9   6.81  )-----------( 195      ( 2020 06:33 )             38.0     134<L>  |  101  |  19<H>  ----------------------------<  86  3.9   |  26  |  1.07    Ca    8.8      2020 06:33  Phos  2.4     07-26  Mg     2.2     07-26    TPro  7.1  /  Alb  3.4<L>  /  TBili  0.7  /  DBili  x   /  AST  22  /  ALT  30  /  AlkPhos  41  07-26      Urinalysis Basic - ( 2020 01:47 )    Color: Yellow / Appearance: Clear / S.005 / pH: x  Gluc: x / Ketone: Negative  / Bili: Negative / Urobili: Negative   Blood: x / Protein: Negative / Nitrite: Negative   Leuk Esterase: Negative / RBC: x / WBC x   Sq Epi: x / Non Sq Epi: x / Bacteria: x

## 2020-07-28 NOTE — PROGRESS NOTE ADULT - ATTENDING COMMENTS
Patient seen and examined this morning around 11.30AM.  Agree with PGY3 A/P above with editing as needed. My independent assessment, findings on exam, diagnosis and plan of care as listed below. Discussed with DR. Andrade.     Patient is a 86y old Male who presents with a chief complaint of recurrent dizziness. Patient was recently treated for HTN urgency and dizziness presumed BPV but returned with similar symptoms.   Patient placed on standing Meclizine yesterday. Denied any dizziness today. was able to ambulate with me independently (with  minimal assistance). He says dizziness is only intermittent. BP well controlled. He was noted to be positive Orthostatic drop in BP this morning.       REVIEW OF SYSTEMS: denies fever, chills, SOB, palpitations, chest pain, abdominal pain, nausea, vomiting, diarrhea, constipation      PHYSICAL EXAM:  GENERAL: NAD, well-groomed, well-developed  NERVOUS SYSTEM:  Alert & Oriented X3, Good concentration;  GAIT: Balanced  CHEST/LUNG: Clear to auscultation bilaterally; No rales, rhonchi, wheezing,  HEART: Regular rate and rhythm;   ABDOMEN: Soft, Nontender, Nondistended; Bowel sounds present  EXTREMITIES:  2+ Peripheral Pulses, No clubbing, cyanosis, or edema  SKIN: No rashes or lesions    LABS:                        11.7   6.82  )-----------( 180      ( 28 Jul 2020 07:16 )             34.2   07-28    137  |  102  |  19<H>  ----------------------------<  80  3.8   |  28  |  1.15    Ca    8.4      28 Jul 2020 07:16  Phos  3.4     07-28  Mg     2.1     07-28    TPro  6.5  /  Alb  3.0<L>  /  TBili  0.5  /  DBili  x   /  AST  19  /  ALT  28  /  AlkPhos  32<L>  07-28      Assessment and plan:  1. Persistent dizziness with tinnitus, possibly BPPV vs Meniere's disease   2. HTN   3. Hyponatremia chronic stable  4. HLD  5. BPH    Plan:   Continue Meclizine q 8 hrs for 2-3 days and then PRN  Will try to get her appt with Neurotolgist Dr. Ganesh Mayer earlier than end of August prior to discharge if feasible.   Appreciate Neurology consult, no need for MRI at this moment   Will cont Salt tablet   BP better controlled; will not increase or add medication as patient was noted to be somewhat Orthostatic  Gentle IV hydration with 1/2 NS for 24 hrs; Repeat Orthostatic BP and HR in AM   patient appears well hydrated, will cont maintenance IVF  PT eval for balance training to teaching of Epley maneuver   DVT ppx;   CBC stable; F/U BMP in AM    Discussed with patient findings and plan of care  PGY3 updated daughter with above  Discussed with PGY3 and RN
Patient is a 86y old  Male who presents with a chief complaint of DIZZINESS (27 Jul 2020 08:40)    Patient was seen and examined at bedside   Still complains of dizziness   Daughter was on present on phone, discussed about possible cause of dizziness is inner ear disease and to follow up with Neurootologist as outpatient. Agrees with management plan.     REVIEW OF SYSTEMS: denies fever, chills, SOB, palpitations, chest pain, abdominal pain, nausea, vomiting, diarrhea, constipation      PHYSICAL EXAM:  GENERAL: NAD, well-groomed, well-developed  NERVOUS SYSTEM:  Alert & Oriented X3, Good concentration; no cerebellar sign or nystagmus   CHEST/LUNG: Clear to auscultation bilaterally; No rales, rhonchi, wheezing, or rubs  HEART: Regular rate and rhythm; No murmurs, rubs, or gallops  ABDOMEN: Soft, Nontender, Nondistended; Bowel sounds present  EXTREMITIES:  2+ Peripheral Pulses, No clubbing, cyanosis, or edema  SKIN: No rashes or lesions    LABS:                        12.3   6.30  )-----------( 180      ( 27 Jul 2020 06:37 )             35.5     134<L>  |  100  |  18  ----------------------------<  78  3.6   |  25  |  1.07      Assessment and plan:  1. Persistent dizziness with tinnitus, possibly BPPV vs Meniere's disease   2. HTN   3. Hyponatremia   4. HLD  5. BPH    Plan:   Will order Meclizine as standing   Appreciate Neurology consult, no need for MRI at this moment   Will cont Salt tablet   BP better controlled today, will not add another antihypertensive   patient appears well hydrated, will cont maintenance IVF  PT eval for balance training to teaching of Epley maneuver   Patient has an appointment with  Neurootologist Dr. Ganesh Mayer at the end of August   Full code  Discussed treatment plan with daughter

## 2020-07-28 NOTE — CHART NOTE - NSCHARTNOTEFT_GEN_A_CORE
Pt daughter Samantha called and explained the medication regimen in detail, all questions answered. Pt daughter Samantha called and explained the medication regimen in detail, all questions answered.  Above noted Pt daughter Samantha called and explained the medication regimen in detail, all questions answered. Pt was scheduled to follow up with Dr Mayer, clinic called today to request to move appointment earlier, clinic requested to call again tomorrow and confirm new date. Will call again tomorrow before patient discharge  Above noted

## 2020-07-28 NOTE — PROGRESS NOTE ADULT - PROBLEM SELECTOR PLAN 1
Possible orthostatic vs BPPV  Pt orthostatic positive in ED, Now improved after IVF  c/w salt tabs  Will do PT eval for balance training  Pt continue to remain dizzy

## 2020-07-28 NOTE — PROGRESS NOTE ADULT - ASSESSMENT
86 year old male [place] [facility]  from home lives with wife ,walks independently came with hx of HTN, HLD, GERD, BPH, vertigo presents with c/o severe dizziness lasting whole day today, pt was feeling better after getting discharged from this hospital last time but vertigo was never completly gone.  pT denies any  acute visual changes, phonophobia, aura, headache, no loss of vision or hearing, no ringing in ears, no fullness in ears. no fever, no abd pain, no dysuria. Pt says no covid exposure, no syncope, no focal weakness, no trouble swallowing. Pt never followed up with outpt neurologist recommended on discharge. 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 Pt ahs no changes in his sensation in extremities.
1. Persistent dizziness, possibly BPPV vs Meniere's disease   2. HTN   3. Hyponatremia   4. HLD  5. BPH    Plan:   Patient complains of associated ringing in ear with dizziness  Will cont Meclizine  Appreciate Neurology consult, no need for MRI at this moment   Will cont Salt tablet   BP better controlled today, patient appears well hydrated, will cont maintenance IVF, will monitor now at this range of BP if that improves symptoms   PT eval for balance training   Patient has an appointment with  Neurootologist Dr. Ganesh Mayer at the end of August   Full code
86 year old male [place] [facility]  from home lives with wife ,walks independently came with hx of HTN, HLD, GERD, BPH, vertigo presents with c/o severe dizziness lasting whole day today, pt was feeling better after getting discharged from this hospital last time but vertigo was never completly gone.  pT denies any  acute visual changes, phonophobia, aura, headache, no loss of vision or hearing, no ringing in ears, no fullness in ears. no fever, no abd pain, no dysuria. Pt says no covid exposure, no syncope, no focal weakness, no trouble swallowing. Pt never followed up with outpt neurologist recommended on discharge. 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 Pt ahs no changes in his sensation in extremities.

## 2020-07-28 NOTE — PROGRESS NOTE ADULT - PROBLEM SELECTOR PLAN 4
-pT has hx of bph   -on tamsulosin 0.4mg qd  -will continue home dose.
-pT has hx of bph   -on tamsulosin 0.4mg qd  -will continue home dose.

## 2020-07-29 ENCOUNTER — TRANSCRIPTION ENCOUNTER (OUTPATIENT)
Age: 85
End: 2020-07-29

## 2020-07-29 VITALS — OXYGEN SATURATION: 97 % | SYSTOLIC BLOOD PRESSURE: 143 MMHG | DIASTOLIC BLOOD PRESSURE: 58 MMHG | HEART RATE: 78 BPM

## 2020-07-29 LAB
ANION GAP SERPL CALC-SCNC: 8 MMOL/L — SIGNIFICANT CHANGE UP (ref 5–17)
BUN SERPL-MCNC: 22 MG/DL — HIGH (ref 7–18)
CALCIUM SERPL-MCNC: 8.5 MG/DL — SIGNIFICANT CHANGE UP (ref 8.4–10.5)
CHLORIDE SERPL-SCNC: 99 MMOL/L — SIGNIFICANT CHANGE UP (ref 96–108)
CO2 SERPL-SCNC: 27 MMOL/L — SIGNIFICANT CHANGE UP (ref 22–31)
CREAT SERPL-MCNC: 1.06 MG/DL — SIGNIFICANT CHANGE UP (ref 0.5–1.3)
GLUCOSE SERPL-MCNC: 78 MG/DL — SIGNIFICANT CHANGE UP (ref 70–99)
HCT VFR BLD CALC: 33.9 % — LOW (ref 39–50)
HGB BLD-MCNC: 11.6 G/DL — LOW (ref 13–17)
MAGNESIUM SERPL-MCNC: 2.1 MG/DL — SIGNIFICANT CHANGE UP (ref 1.6–2.6)
MCHC RBC-ENTMCNC: 30.4 PG — SIGNIFICANT CHANGE UP (ref 27–34)
MCHC RBC-ENTMCNC: 34.2 GM/DL — SIGNIFICANT CHANGE UP (ref 32–36)
MCV RBC AUTO: 88.7 FL — SIGNIFICANT CHANGE UP (ref 80–100)
NRBC # BLD: 0 /100 WBCS — SIGNIFICANT CHANGE UP (ref 0–0)
PHOSPHATE SERPL-MCNC: 3.2 MG/DL — SIGNIFICANT CHANGE UP (ref 2.5–4.5)
PLATELET # BLD AUTO: 180 K/UL — SIGNIFICANT CHANGE UP (ref 150–400)
POTASSIUM SERPL-MCNC: 3.8 MMOL/L — SIGNIFICANT CHANGE UP (ref 3.5–5.3)
POTASSIUM SERPL-SCNC: 3.8 MMOL/L — SIGNIFICANT CHANGE UP (ref 3.5–5.3)
RBC # BLD: 3.82 M/UL — LOW (ref 4.2–5.8)
RBC # FLD: 13.2 % — SIGNIFICANT CHANGE UP (ref 10.3–14.5)
SODIUM SERPL-SCNC: 134 MMOL/L — LOW (ref 135–145)
WBC # BLD: 6.62 K/UL — SIGNIFICANT CHANGE UP (ref 3.8–10.5)
WBC # FLD AUTO: 6.62 K/UL — SIGNIFICANT CHANGE UP (ref 3.8–10.5)

## 2020-07-29 PROCEDURE — 82962 GLUCOSE BLOOD TEST: CPT

## 2020-07-29 PROCEDURE — 80061 LIPID PANEL: CPT

## 2020-07-29 PROCEDURE — 99053 MED SERV 10PM-8AM 24 HR FAC: CPT

## 2020-07-29 PROCEDURE — 82607 VITAMIN B-12: CPT

## 2020-07-29 PROCEDURE — 81003 URINALYSIS AUTO W/O SCOPE: CPT

## 2020-07-29 PROCEDURE — 84300 ASSAY OF URINE SODIUM: CPT

## 2020-07-29 PROCEDURE — 99239 HOSP IP/OBS DSCHRG MGMT >30: CPT | Mod: GC

## 2020-07-29 PROCEDURE — 84443 ASSAY THYROID STIM HORMONE: CPT

## 2020-07-29 PROCEDURE — 83036 HEMOGLOBIN GLYCOSYLATED A1C: CPT

## 2020-07-29 PROCEDURE — 82746 ASSAY OF FOLIC ACID SERUM: CPT

## 2020-07-29 PROCEDURE — 97161 PT EVAL LOW COMPLEX 20 MIN: CPT

## 2020-07-29 PROCEDURE — U0003: CPT

## 2020-07-29 PROCEDURE — 83735 ASSAY OF MAGNESIUM: CPT

## 2020-07-29 PROCEDURE — 83935 ASSAY OF URINE OSMOLALITY: CPT

## 2020-07-29 PROCEDURE — 84484 ASSAY OF TROPONIN QUANT: CPT

## 2020-07-29 PROCEDURE — 85027 COMPLETE CBC AUTOMATED: CPT

## 2020-07-29 PROCEDURE — 86769 SARS-COV-2 COVID-19 ANTIBODY: CPT

## 2020-07-29 PROCEDURE — 84100 ASSAY OF PHOSPHORUS: CPT

## 2020-07-29 PROCEDURE — 93005 ELECTROCARDIOGRAM TRACING: CPT

## 2020-07-29 PROCEDURE — 96360 HYDRATION IV INFUSION INIT: CPT

## 2020-07-29 PROCEDURE — 82570 ASSAY OF URINE CREATININE: CPT

## 2020-07-29 PROCEDURE — 80053 COMPREHEN METABOLIC PANEL: CPT

## 2020-07-29 PROCEDURE — 82306 VITAMIN D 25 HYDROXY: CPT

## 2020-07-29 PROCEDURE — 87086 URINE CULTURE/COLONY COUNT: CPT

## 2020-07-29 PROCEDURE — 80048 BASIC METABOLIC PNL TOTAL CA: CPT

## 2020-07-29 PROCEDURE — 99285 EMERGENCY DEPT VISIT HI MDM: CPT

## 2020-07-29 PROCEDURE — 36415 COLL VENOUS BLD VENIPUNCTURE: CPT

## 2020-07-29 RX ORDER — MECLIZINE HCL 12.5 MG
1 TABLET ORAL
Qty: 30 | Refills: 0
Start: 2020-07-29 | End: 2020-08-07

## 2020-07-29 RX ORDER — MECLIZINE HCL 12.5 MG
1 TABLET ORAL
Qty: 0 | Refills: 0 | DISCHARGE

## 2020-07-29 RX ORDER — RANITIDINE HYDROCHLORIDE 150 MG/1
1 TABLET, FILM COATED ORAL
Qty: 0 | Refills: 0 | DISCHARGE

## 2020-07-29 RX ORDER — METOPROLOL TARTRATE 50 MG
1 TABLET ORAL
Qty: 30 | Refills: 0
Start: 2020-07-29 | End: 2020-08-27

## 2020-07-29 RX ORDER — SODIUM CHLORIDE 9 MG/ML
1 INJECTION INTRAMUSCULAR; INTRAVENOUS; SUBCUTANEOUS
Qty: 60 | Refills: 0
Start: 2020-07-29 | End: 2020-08-27

## 2020-07-29 RX ORDER — OMEPRAZOLE 10 MG/1
1 CAPSULE, DELAYED RELEASE ORAL
Qty: 30 | Refills: 0
Start: 2020-07-29 | End: 2020-08-27

## 2020-07-29 RX ORDER — METOPROLOL TARTRATE 50 MG
0.5 TABLET ORAL
Qty: 15 | Refills: 0
Start: 2020-07-29 | End: 2020-08-27

## 2020-07-29 RX ORDER — ERGOCALCIFEROL 1.25 MG/1
1 CAPSULE ORAL
Qty: 30 | Refills: 0
Start: 2020-07-29 | End: 2020-08-27

## 2020-07-29 RX ORDER — FEBUXOSTAT 40 MG/1
1 TABLET ORAL
Qty: 0 | Refills: 0 | DISCHARGE

## 2020-07-29 RX ORDER — NIFEDIPINE 30 MG
1 TABLET, EXTENDED RELEASE 24 HR ORAL
Qty: 30 | Refills: 0
Start: 2020-07-29 | End: 2020-08-27

## 2020-07-29 RX ADMIN — HEPARIN SODIUM 5000 UNIT(S): 5000 INJECTION INTRAVENOUS; SUBCUTANEOUS at 05:57

## 2020-07-29 RX ADMIN — Medication 30 MILLIGRAM(S): at 05:57

## 2020-07-29 RX ADMIN — SODIUM CHLORIDE 1 GRAM(S): 9 INJECTION INTRAMUSCULAR; INTRAVENOUS; SUBCUTANEOUS at 13:08

## 2020-07-29 RX ADMIN — Medication 81 MILLIGRAM(S): at 11:56

## 2020-07-29 RX ADMIN — Medication 25 MILLIGRAM(S): at 13:08

## 2020-07-29 RX ADMIN — PANTOPRAZOLE SODIUM 40 MILLIGRAM(S): 20 TABLET, DELAYED RELEASE ORAL at 05:57

## 2020-07-29 RX ADMIN — Medication 12.5 MILLIGRAM(S): at 05:57

## 2020-07-29 RX ADMIN — Medication 25 MILLIGRAM(S): at 05:57

## 2020-07-29 RX ADMIN — ERGOCALCIFEROL 50000 UNIT(S): 1.25 CAPSULE ORAL at 11:56

## 2020-07-29 RX ADMIN — SODIUM CHLORIDE 1 GRAM(S): 9 INJECTION INTRAMUSCULAR; INTRAVENOUS; SUBCUTANEOUS at 05:58

## 2020-07-29 NOTE — DISCHARGE NOTE PROVIDER - NSDCFUSCHEDAPPT_GEN_ALL_CORE_FT
MADISON ARIAS ; 08/25/2020 ; [facility] [address] [place] [address] MADISON ARIAS ; 08/25/2020 ; [facility] [address] [place] [address] MADISON ARIAS ; 08/25/2020 ; [facility] [address] [place] [address] MADISON ARIAS ; 08/25/2020 ; [facility] [address] [place] [address] MADISON ARIAS ; 08/25/2020 ; [facility] [address] [place] [address] MADISON ARIAS ; 08/25/2020 ; [facility] [address] [place] [address]

## 2020-07-29 NOTE — DISCHARGE NOTE NURSING/CASE MANAGEMENT/SOCIAL WORK - PATIENT PORTAL LINK FT
You can access the FollowMyHealth Patient Portal offered by [facility] by registering at the following website: [email or web address]. By joining [employer]’s FollowMyHealth portal, you will also be able to view your health information using other applications (apps) compatible with our system.

## 2020-07-29 NOTE — DISCHARGE NOTE PROVIDER - HOSPITAL COURSE
86 year old male Cantonese (tauch[place] dialect)  from home lives with wife ,walks independently came with hx of HTN, HLD, GERD, BPH, vertigo presents with c/o severe dizziness who was admitted for dizziness. Pt was recently admitted with similar complaints and underwent extensive cardiac and neurological workup on last admission. Pt was noted to be orthostatic positive on admission and was started on IVF. 86 year old male [place] (tauch[place] dialect)  from home lives with wife ,walks independently came with hx of HTN, HLD, GERD, BPH, vertigo presents with c/o severe dizziness who was admitted for dizziness. Pt was recently admitted with similar complaints and underwent extensive cardiac and neurological workup on last admission. Pt was noted to be orthostatic positive on admission and was started on IVF. Pt was also seen by neurology who recommended no further imaging. Pt remained on meclizine and improved symptomatically.     Pt was also started on salt tabs for hyponatremia, pt hyponatremia is clinically improved. 86 year old male [place] (tauch[place] dialect)  from home lives with wife ,walks independently came with hx of HTN, HLD, GERD, BPH, vertigo presents with c/o severe dizziness who was admitted for dizziness. Pt was recently admitted with similar complaints and underwent extensive cardiac and neurological workup on last admission. Pt was noted to be orthostatic positive on admission and was started on IVF. Pt was also seen by neurology who recommended no further imaging. Pt remained on meclizine and improved symptomatically.     Pt was also started on salt tabs for hyponatremia, pt hyponatremia is clinically improved.incomplete

## 2020-07-29 NOTE — DISCHARGE NOTE PROVIDER - NSDCMRMEDTOKEN_GEN_ALL_CORE_FT
Aspirin Low Dose 81 mg oral tablet, chewable: 1 tab(s) orally once a day  candesartan 8 mg oral tablet: 1 tab(s) orally once a day   febuxostat 40 mg oral tablet: 1 tab(s) orally once a day  Lipitor 10 mg oral tablet: 1 tab(s) orally once a day (at bedtime)  meclizine 25 mg oral tablet: 1 tab(s) orally prn, As Needed  Metoprolol Succinate ER 25 mg oral tablet, extended release: 1 tab(s) orally once a day   NIFEdipine (Eqv-Procardia XL) 30 mg oral tablet, extended release: 1 tab(s) orally once a day  omeprazole 40 mg oral delayed release capsule: 1 cap(s) orally once a day before meals  raNITIdine 150 mg oral capsule: 1 cap(s) orally once a day  tamsulosin 0.4 mg oral capsule: 1 cap(s) orally once a day (at bedtime) Aspirin Low Dose 81 mg oral tablet, chewable: 1 tab(s) orally once a day  ergocalciferol 50,000 intl units (1.25 mg) oral capsule: 1 cap(s) orally once a week  Lipitor 10 mg oral tablet: 1 tab(s) orally once a day (at bedtime)  meclizine 25 mg oral tablet: 1 tab(s) orally 3 times a day, As Needed   Metoprolol Succinate ER 25 mg oral tablet, extended release: 0.5 tab(s) orally once a day   NIFEdipine (Eqv-Procardia XL) 30 mg oral tablet, extended release: 1 tab(s) orally once a day  omeprazole 40 mg oral delayed release capsule: 1 cap(s) orally once a day before meals  sodium chloride 1 g oral tablet: 1 tab(s) orally 2 times a day   tamsulosin 0.4 mg oral capsule: 1 cap(s) orally once a day (at bedtime) Aspirin Low Dose 81 mg oral tablet, chewable: 1 tab(s) orally once a day  ergocalciferol 50,000 intl units (1.25 mg) oral capsule: 1 cap(s) orally once a week  Lipitor 10 mg oral tablet: 1 tab(s) orally once a day (at bedtime)  meclizine 25 mg oral tablet: 1 tab(s) orally 3 times a day, As Needed   Metoprolol Succinate ER 25 mg oral tablet, extended release: 1 tab(s) orally once a day   NIFEdipine (Eqv-Procardia XL) 30 mg oral tablet, extended release: 1 tab(s) orally once a day  omeprazole 40 mg oral delayed release capsule: 1 cap(s) orally once a day before meals  sodium chloride 1 g oral tablet: 1 tab(s) orally 2 times a day   tamsulosin 0.4 mg oral capsule: 1 cap(s) orally once a day (at bedtime)

## 2020-07-29 NOTE — DISCHARGE NOTE PROVIDER - NSDCCPCAREPLAN_GEN_ALL_CORE_FT
PRINCIPAL DISCHARGE DIAGNOSIS  Diagnosis: Dizziness  Assessment and Plan of Treatment:       SECONDARY DISCHARGE DIAGNOSES  Diagnosis: HTN (hypertension)  Assessment and Plan of Treatment: HTN (hypertension)    Diagnosis: MARTITA (acute kidney injury)  Assessment and Plan of Treatment: MARTITA (acute kidney injury)    Diagnosis: BPH (benign prostatic hyperplasia)  Assessment and Plan of Treatment: BPH (benign prostatic hyperplasia)    Diagnosis: GERD (gastroesophageal reflux disease)  Assessment and Plan of Treatment: GERD (gastroesophageal reflux disease)    Diagnosis: Hyponatremia  Assessment and Plan of Treatment: PRINCIPAL DISCHARGE DIAGNOSIS  Diagnosis: Benign paroxysmal positional vertigo  Assessment and Plan of Treatment: You came in with dizziness, you were recently admitted with similar complaints and you underwent extensive neurologocal and cardiac workup done. You were seen by neurology and physical therapy. Your symptoms improved with meclizine. You are advised to continue meclizine 3 times daily for 2 days and then take it as needed. You were noted to have blood pressure drop while you stand up. You are advised to sit for aboout 2  minutes before you stand up and hold something before you move.      SECONDARY DISCHARGE DIAGNOSES  Diagnosis: HTN (hypertension)  Assessment and Plan of Treatment: you are on multiple bp medications at home. Your blood pressure has been in acceptable range. We have decreased your BP medication to metoprolol 12.5mg daily and nifedipime 30mg daily    Diagnosis: MARTITA (acute kidney injury)  Assessment and Plan of Treatment: You came in with elevated renal functions, you were started on gentle hydration and your renal function improved. You are advised to take 8-10 glasses of water daily. Please follow up with you pcp within 2 weeks of discharge    Diagnosis: BPH (benign prostatic hyperplasia)  Assessment and Plan of Treatment: Continue with tamsulosin daily    Diagnosis: GERD (gastroesophageal reflux disease)  Assessment and Plan of Treatment: Please continue with pantoprazole daily before breakfast    Diagnosis: Hyponatremia  Assessment and Plan of Treatment: You came in with hyponatremia, you were started on salt tabs and you are advised to continue to take 1 tablet daily 2 times a day PRINCIPAL DISCHARGE DIAGNOSIS  Diagnosis: Benign paroxysmal positional vertigo  Assessment and Plan of Treatment: You came in with dizziness, you were recently admitted with similar complaints and you underwent extensive neurologocal and cardiac workup done. You were seen by neurology and physical therapy. Your symptoms improved with meclizine. You are advised to continue meclizine 3 times daily for 2 days and then take it as needed. You were noted to have blood pressure drop while you stand up. You are advised to sit for aboout 2  minutes before you stand up and hold something before you move. You have to follow up with Dr Mayer, your clinic was called to prepone your appointment, please follow up with your clinic for confirmation of new appointment date. Clinic number mentioned on papers      SECONDARY DISCHARGE DIAGNOSES  Diagnosis: HTN (hypertension)  Assessment and Plan of Treatment: you are on multiple bp medications at home. Your blood pressure has been in acceptable range. We have decreased your BP medication to metoprolol 12.5mg daily and nifedipime 30mg daily    Diagnosis: MARTITA (acute kidney injury)  Assessment and Plan of Treatment: You came in with elevated renal functions, you were started on gentle hydration and your renal function improved. You are advised to take 8-10 glasses of water daily. Please follow up with you pcp within 2 weeks of discharge    Diagnosis: BPH (benign prostatic hyperplasia)  Assessment and Plan of Treatment: Continue with tamsulosin daily    Diagnosis: GERD (gastroesophageal reflux disease)  Assessment and Plan of Treatment: Please continue with pantoprazole daily before breakfast    Diagnosis: Hyponatremia  Assessment and Plan of Treatment: You came in with hyponatremia, you were started on salt tabs and you are advised to continue to take 1 tablet daily 2 times a day PRINCIPAL DISCHARGE DIAGNOSIS  Diagnosis: Benign paroxysmal positional vertigo  Assessment and Plan of Treatment: You came in with dizziness, you were recently admitted with similar complaints and you underwent extensive neurologocal and cardiac workup done. You were seen by neurology and physical therapy. Your symptoms improved with meclizine. You are advised to continue Meclizine 3 times daily for 2 days and then take it as needed. You were noted to have blood pressure drop while you stand up likely from delayed postural reflexes.. You are advised to sit for aboout 2  minutes before you stand up and once stand up wait  a minute or so before walking. You have to follow up with Dr. Ganesh Mayer, ENT (Neurotologist) your clinic was called to prepone your appointment if possible, please follow up with your clinic for confirmation of new appointment date. Clinic number mentioned on papers      SECONDARY DISCHARGE DIAGNOSES  Diagnosis: HTN (hypertension)  Assessment and Plan of Treatment: You are on multiple blood pressure medications at home. Your blood pressure has been in acceptable range with Nifedipine and low dose Metoprolol 25 mg once daily. We will continue to hold off Candesartan for now. On follow up visit with your PCVP Dr. Hull, if Blood Pressure more than 150/90 mm Hg, your PCP can resume low dose Candesartan. Target BP for your age less than 140/80 mm Hg.    Diagnosis: Hyponatremia  Assessment and Plan of Treatment: You came in with hyponatremia on your previous admission, you were started on salt tabs and you are advised to continue to take 1 tablet but reduce to two times a day.    Diagnosis: MARTITA (acute kidney injury)  Assessment and Plan of Treatment: You came in with elevated renal functions, you were started on gentle hydration and your renal function improved. You are advised to take 8-10 glasses of water daily. Please follow up with you pcp within 2 weeks of discharge    Diagnosis: BPH (benign prostatic hyperplasia)  Assessment and Plan of Treatment: Continue with tamsulosin daily    Diagnosis: GERD (gastroesophageal reflux disease)  Assessment and Plan of Treatment: Please continue with pantoprazole daily before breakfast PRINCIPAL DISCHARGE DIAGNOSIS  Diagnosis: Benign paroxysmal positional vertigo  Assessment and Plan of Treatment: You came in with dizziness, you were recently admitted with similar complaints and you underwent extensive neurologocal and cardiac workup done. You were seen by neurology and physical therapy. Your symptoms improved with meclizine. You are advised to continue Meclizine 3 times daily for 2 days and then take it as needed. You were noted to have blood pressure drop while you stand up likely from delayed postural reflexes.. You are advised to sit for aboout 2  minutes before you stand up and once stand up wait  a minute or so before walking. You have to follow up with Dr. Ganesh Mayer, ENT (Neurotologist) your clinic was called to prepone your appointment if possible, please follow up with your clinic for confirmation of new appointment date. Clinic number mentioned on papers      SECONDARY DISCHARGE DIAGNOSES  Diagnosis: HTN (hypertension)  Assessment and Plan of Treatment: You are on multiple blood pressure medications at home. Your blood pressure has been in acceptable range with Nifedipine and low dose Metoprolol 25 mg once daily. We will continue to hold off Candesartan for now. On follow up visit with your PCVP Dr. Hull, if Blood Pressure more than 150/90 mm Hg, your PCP can resume low dose Candesartan. Target BP for your age less than 140/80 mm Hg.    Diagnosis: Hyponatremia  Assessment and Plan of Treatment: You came in with hyponatremia on your previous admission, you were started on salt tabs and you are advised to continue to take 1 tablet but reduce to two times a day. Please check Sodium level with PCP on follow up next mONDAY.    Diagnosis: MARTITA (acute kidney injury)  Assessment and Plan of Treatment: You came in with elevated renal functions, you were started on gentle hydration and your renal function improved. You are advised to take 8-10 glasses of water daily. Please follow up with you pcp within 2 weeks of discharge    Diagnosis: BPH (benign prostatic hyperplasia)  Assessment and Plan of Treatment: Continue with tamsulosin daily    Diagnosis: GERD (gastroesophageal reflux disease)  Assessment and Plan of Treatment: Please continue with pantoprazole daily before breakfast

## 2020-08-21 ENCOUNTER — APPOINTMENT (OUTPATIENT)
Dept: OTOLARYNGOLOGY | Facility: CLINIC | Age: 85
End: 2020-08-21
Payer: MEDICARE

## 2020-08-21 DIAGNOSIS — H93.293 OTHER ABNORMAL AUDITORY PERCEPTIONS, BILATERAL: ICD-10-CM

## 2020-08-21 DIAGNOSIS — H90.3 SENSORINEURAL HEARING LOSS, BILATERAL: ICD-10-CM

## 2020-08-21 DIAGNOSIS — R42 DIZZINESS AND GIDDINESS: ICD-10-CM

## 2020-08-21 PROCEDURE — 99204 OFFICE O/P NEW MOD 45 MIN: CPT | Mod: 25

## 2020-08-21 PROCEDURE — 92557 COMPREHENSIVE HEARING TEST: CPT

## 2020-08-21 PROCEDURE — 92567 TYMPANOMETRY: CPT

## 2020-08-21 RX ORDER — TROSPIUM CHLORIDE 60 MG/1
60 CAPSULE, EXTENDED RELEASE ORAL
Qty: 90 | Refills: 1 | Status: DISCONTINUED | COMMUNITY
Start: 2018-01-31 | End: 2020-08-21

## 2020-08-21 RX ORDER — FEBUXOSTAT 40 MG/1
40 TABLET ORAL
Refills: 0 | Status: DISCONTINUED | COMMUNITY
End: 2020-08-21

## 2020-08-21 RX ORDER — VALSARTAN 160 MG/1
160 TABLET ORAL
Refills: 0 | Status: DISCONTINUED | COMMUNITY
End: 2020-08-21

## 2020-08-21 RX ORDER — NIFEDIPINE 30 MG/1
30 TABLET, EXTENDED RELEASE ORAL
Refills: 0 | Status: ACTIVE | COMMUNITY

## 2020-08-21 RX ORDER — METOPROLOL TARTRATE 25 MG/1
25 TABLET, FILM COATED ORAL
Refills: 0 | Status: ACTIVE | COMMUNITY

## 2020-08-21 RX ORDER — OMEGA-3/DHA/EPA/FISH OIL 300-1000MG
400 CAPSULE ORAL
Qty: 30 | Refills: 1 | Status: DISCONTINUED | COMMUNITY
Start: 2019-06-27 | End: 2020-08-21

## 2020-08-21 RX ORDER — NORMAL SALT TABLETS 1 G/G
TABLET ORAL
Refills: 0 | Status: ACTIVE | COMMUNITY

## 2020-08-21 NOTE — DATA REVIEWED
[de-identified] : I personally reviewed head CT images and the report, which shows no ME/mastoid pathology.\par  [de-identified] : I personally reviewed the patient's audiogram, which shows b/l symmetric SNHL, type a  tymps.\par

## 2020-08-21 NOTE — REASON FOR VISIT
[Initial Consultation] : an initial consultation for [Family Member] : family member [FreeTextEntry2] : referred by Dr Tara Hull PCP  for vertigo

## 2020-08-21 NOTE — HISTORY OF PRESENT ILLNESS
[de-identified] : 86 year old female referred by Dr Tara Hull PCP  for vertigo, intermittently for the past 10 years than has been getting worse for the past 2-3 months, occurring more frequently, few times a week. Vertigo can last for hours.  Associated nausea, no moviting.  Uses Meclizine as needed with relief.   Hospitalized twice in 07/2020, once for low BP then for recurrence of severe dizziness.  Reports occasional bilateral otorrhea. Reports intermittent bilateral tinnitus, non fluctuating.  Patient denies otalgia,  ear infections, hearing loss.  \par \par

## 2020-08-21 NOTE — PHYSICAL EXAM
[] : Trade-Hallpike test is negative [Normal] : no rashes [de-identified] : no head impulse nystagmus

## 2020-09-05 ENCOUNTER — OUTPATIENT (OUTPATIENT)
Dept: OUTPATIENT SERVICES | Facility: HOSPITAL | Age: 85
LOS: 1 days | End: 2020-09-05
Payer: COMMERCIAL

## 2020-09-05 ENCOUNTER — APPOINTMENT (OUTPATIENT)
Dept: MRI IMAGING | Facility: CLINIC | Age: 85
End: 2020-09-05
Payer: MEDICARE

## 2020-09-05 DIAGNOSIS — R42 DIZZINESS AND GIDDINESS: ICD-10-CM

## 2020-09-05 PROCEDURE — 70553 MRI BRAIN STEM W/O & W/DYE: CPT | Mod: 26

## 2020-09-05 PROCEDURE — A9585: CPT

## 2020-09-05 PROCEDURE — 70553 MRI BRAIN STEM W/O & W/DYE: CPT

## 2022-05-10 NOTE — PROGRESS NOTE ADULT - PROBLEM SELECTOR PLAN 7
Refer to the Assessment tab to view/cancel completed assessment.
-pt has a hx of GERD  -will [facility] protonix.
-pt has a hx of GERD  -will [facility] protonix.

## 2024-03-02 NOTE — ED PROVIDER NOTE - CPE EDP CARDIAC NORM
JESUS called this RN and said pt is packing things up and wanting to leave. As entering the room pt is packing his clothes and says he is leaving once his ride gets here. \" I dont care what anyone says\". Explained the risks of leaving AMA to pt and pt verbalized understanding. Perfect Serve sent to MD to see if he can see pt soon, MD is waiting on labs to result and then will discuss DC with pt. Pt verbalized understanding and is still insisting he is leaving once his ride is here.   
Left arm;
normal...

## 2024-04-04 ENCOUNTER — APPOINTMENT (OUTPATIENT)
Dept: SURGERY | Facility: CLINIC | Age: 89
End: 2024-04-04
Payer: MEDICARE

## 2024-04-04 VITALS
BODY MASS INDEX: 19.32 KG/M2 | OXYGEN SATURATION: 95 % | WEIGHT: 105 LBS | SYSTOLIC BLOOD PRESSURE: 152 MMHG | HEART RATE: 63 BPM | HEIGHT: 62 IN | DIASTOLIC BLOOD PRESSURE: 49 MMHG

## 2024-04-04 DIAGNOSIS — Z86.79 PERSONAL HISTORY OF OTHER DISEASES OF THE CIRCULATORY SYSTEM: ICD-10-CM

## 2024-04-04 DIAGNOSIS — K40.90 UNILATERAL INGUINAL HERNIA, W/OUT OBSTRUCTION OR GANGRENE, NOT SPECIFIED AS RECURRENT: ICD-10-CM

## 2024-04-04 PROCEDURE — 99203 OFFICE O/P NEW LOW 30 MIN: CPT

## 2024-04-04 NOTE — PLAN
[FreeTextEntry1] : discussed open repair of left inguinoscrotal hernia, with mesh  patient states it is not very bothersome and denies symptoms, no pain  they do not want to pursue surgical intervention at this time and will call back in the event symptoms progress/worsen.  Warning signs, follow up, and restrictions were discussed with the patient.  RTO PRN Patient's questions and concerns addressed to their satisfaction, and patient verbalized an understanding of the information discussed.

## 2024-04-04 NOTE — PHYSICAL EXAM
[No Rash or Lesion] : No rash or lesion [Alert] : alert [Oriented to Person] : oriented to person [Oriented to Place] : oriented to place [Oriented to Time] : oriented to time [Calm] : calm [de-identified] : A/Ox3; NAD. appears comfortable [de-identified] : EOMI; sclera anicteric. [de-identified] : no cervical lymphadenopathy  [de-identified] : airway patent, no use of accessory muscles [de-identified] : Abd is soft, nondistended, no rebound or guarding. No abdominal masses. No abdominal tenderness. [de-identified] : left inguinoscrotal hernia  [de-identified] : +ROM, normal gait

## 2024-04-04 NOTE — REASON FOR VISIT
[Consultation] : a consultation visit [Family Member] : family member [FreeTextEntry1] : evaluation for L inguinal hernia

## 2024-04-04 NOTE — CONSULT LETTER
[Dear  ___] : Dear  [unfilled], [Consult Letter:] : I had the pleasure of evaluating your patient, [unfilled]. [Consult Closing:] : Thank you very much for allowing me to participate in the care of this patient.  If you have any questions, please do not hesitate to contact me. [Sincerely,] : Sincerely, [FreeTextEntry3] : Shane Butcher MD

## 2024-04-12 NOTE — DISCHARGE NOTE PROVIDER - NSDCQMAMI_CARD_ALL_CORE
[FreeTextEntry1] : Ms. JO BAEZA is here for follow up of hypothyroidism.  On levothyroxine 137 mcg daily , dose changed in 6/22, TSH was around 8 then. TSH 0.329 in 4/23.Then in 10/24 her TSH was 0.027, so we backed off to   take 1 pill of levothyroxine  for 6 days a week and take 1/2 pill for 1 day a week, she says she took it for some time, but now for last few months she has been tasking 1 pill daily. But admits to missing doses on same dose her TSH was suppressed when she took it regularly, now TSH mildly high around 7, but she admits to not taking it regularly.  Recommended to take it regularly and repeat blood work in 3 months  Discussed with patient how to take thyroid medication appropriately,empty stomach , all Multi vitamins  4 to 6 hrs away form thyroid medication, he voiced understanding. Will repeat blood work this and  next visit.   RTC in 3 months with blood work No